# Patient Record
Sex: FEMALE | Race: BLACK OR AFRICAN AMERICAN | Employment: UNEMPLOYED | URBAN - METROPOLITAN AREA
[De-identification: names, ages, dates, MRNs, and addresses within clinical notes are randomized per-mention and may not be internally consistent; named-entity substitution may affect disease eponyms.]

---
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
## 2022-03-19 PROBLEM — N94.6 DYSMENORRHEA: Status: ACTIVE | Noted: 2018-10-04

## 2022-10-31 ENCOUNTER — OFFICE VISIT (OUTPATIENT)
Dept: INTERNAL MEDICINE CLINIC | Age: 22
End: 2022-10-31
Payer: MEDICAID

## 2022-10-31 VITALS
RESPIRATION RATE: 18 BRPM | OXYGEN SATURATION: 98 % | DIASTOLIC BLOOD PRESSURE: 76 MMHG | HEART RATE: 97 BPM | HEIGHT: 67 IN | BODY MASS INDEX: 29.82 KG/M2 | WEIGHT: 190 LBS | TEMPERATURE: 97.9 F | SYSTOLIC BLOOD PRESSURE: 112 MMHG

## 2022-10-31 DIAGNOSIS — M54.50 CHRONIC BILATERAL LOW BACK PAIN WITHOUT SCIATICA: ICD-10-CM

## 2022-10-31 DIAGNOSIS — Z23 ENCOUNTER FOR IMMUNIZATION: Primary | ICD-10-CM

## 2022-10-31 DIAGNOSIS — Z11.59 ENCOUNTER FOR HEPATITIS C SCREENING TEST FOR LOW RISK PATIENT: ICD-10-CM

## 2022-10-31 DIAGNOSIS — G89.29 CHRONIC BILATERAL LOW BACK PAIN WITHOUT SCIATICA: ICD-10-CM

## 2022-10-31 DIAGNOSIS — Z23 NEEDS FLU SHOT: ICD-10-CM

## 2022-10-31 PROCEDURE — 99203 OFFICE O/P NEW LOW 30 MIN: CPT | Performed by: INTERNAL MEDICINE

## 2022-10-31 PROCEDURE — 90686 IIV4 VACC NO PRSV 0.5 ML IM: CPT | Performed by: INTERNAL MEDICINE

## 2022-10-31 RX ORDER — DICLOFENAC SODIUM 10 MG/G
2 GEL TOPICAL 4 TIMES DAILY
Qty: 20 G | Refills: 1 | Status: SHIPPED | OUTPATIENT
Start: 2022-10-31

## 2022-10-31 NOTE — PROGRESS NOTES
HISTORY OF PRESENT ILLNESS  Rosas Johnson is a 23 y.o. female. Patient comes to establish PCP. Patient complaining of lower back pain which started about 2 years ago when she was in college. She relates this more to her posture. There is no history of trauma. There is no radiation of the pain to the legs, no numbness tingling or weakness. She would like to start physical therapy. She works in [employer] in [address] and she would like to be seen there. PAST MEDICAL HISTORY  Medical: History of iron deficiency in the past.  Surgical: Denied. Allergies: Denied. Medication: Denied. Work: In [address] [place] [address] [employer] in [address]. Social: Tobacco denied, alcohol denied, recreational drugs marijuana only once. Sexual: Single, no partner, no children, STI in the past trichomonas treated, no birth control. Review of Systems   Constitutional:  Negative for chills and fever. Respiratory:  Negative for shortness of breath. Cardiovascular:  Negative for chest pain. Visit Vitals  /76 (BP 1 Location: Left upper arm, BP Patient Position: Sitting, BP Cuff Size: Adult)   Pulse 97   Temp 97.9 °F (36.6 °C) (Temporal)   Resp 18   Ht 5' 6.93\" (1.7 m)   Wt 190 lb (86.2 kg)   LMP 10/17/2022   SpO2 98%   BMI 29.82 kg/m²     Physical Exam  Constitutional:       Appearance: Normal appearance. HENT:      Mouth/Throat:      Mouth: Mucous membranes are moist.      Pharynx: Oropharynx is clear. Eyes:      Extraocular Movements: Extraocular movements intact. Conjunctiva/sclera: Conjunctivae normal.      Pupils: Pupils are equal, round, and reactive to light. Cardiovascular:      Rate and Rhythm: Normal rate and regular rhythm. Pulses: Normal pulses. Heart sounds: Normal heart sounds. Pulmonary:      Effort: Pulmonary effort is normal.      Breath sounds: Normal breath sounds. Abdominal:      General: Bowel sounds are normal.      Palpations: Abdomen is soft. Musculoskeletal:      Cervical back: Normal range of motion. Comments: There is a mild tenderness paraspinal at the level of L3-L4. Very mild muscle contracture. Lymphadenopathy:      Cervical: No cervical adenopathy. Skin:     General: Skin is warm. Neurological:      Mental Status: She is alert and oriented to person, place, and time. Psychiatric:         Mood and Affect: Mood normal.       ASSESSMENT and PLAN    ICD-10-CM ICD-9-CM    1. Encounter for immunization  Z23 V03.89       2. Needs flu shot  Z23 V04.81 INFLUENZA, FLUARIX, FLULAVAL, FLUZONE (AGE 6 MO+), AFLURIA(AGE 3Y+) IM, PF, 0.5 ML      3. Chronic bilateral low back pain without sciatica  M54.50 724.2 REFERRAL TO PHYSICAL THERAPY    G89.29 338.29 diclofenac (VOLTAREN) 1 % gel      4. Encounter for hepatitis C screening test for low risk patient  Z11.59 V73.89 HEPATITIS C AB      Patient comes to establish PCP. Referral to physical therapy [facility] at [facility]. Prescribed Voltaren gel to be applied up to 4 times a day or as needed for a week or so. Skin hepatitis C today. Screening for depression today. Flu shot today. She had a COVID  She was vaccinated.   Follow-up in 6 months for health maintenance

## 2022-10-31 NOTE — PROGRESS NOTES
Flu Immunization/s administered 10/31/2022 by Ana Richardson with consent. Patient tolerated procedure well. No reactions noted.

## 2022-10-31 NOTE — PROGRESS NOTES
Keren Lagos is a 23 y.o. female (: 2000) presenting to address:    Chief Complaint   Patient presents with    New Patient    Establish Care       Vitals:    10/31/22 0814   BP: 112/76   Pulse: 97   Resp: 18   Temp: 97.9 °F (36.6 °C)   TempSrc: Temporal   SpO2: 98%   Weight: 190 lb (86.2 kg)   Height: 5' 6.93\" (1.7 m)   PainSc:   0 - No pain   LMP: 10/17/2022       Hearing/Vision:   No results found. Learning Assessment:     Learning Assessment 10/4/2018   PRIMARY LEARNER Patient   HIGHEST LEVEL OF EDUCATION - PRIMARY LEARNER  DID NOT GRADUATE HIGH SCHOOL   BARRIERS PRIMARY LEARNER NONE   CO-LEARNER CAREGIVER No   CO-LEARNER NAME Jennifer HIGHEST LEVEL OF EDUCATION GRADUATED HIGH SCHOOL OR GED   BARRIERS CO-LEARNER NONE   PRIMARY LANGUAGE ENGLISH   PRIMARY LANGUAGE CO-LEARNER ENGLISH    NEED No   LEARNER PREFERENCE PRIMARY DEMONSTRATION   LEARNER PREFERENCE CO-LEARNER READING   LEARNING SPECIAL TOPICS none   ANSWERED BY self   RELATIONSHIP SELF     Depression Screening:     3 most recent PHQ Screens 10/31/2022   Little interest or pleasure in doing things Several days   Feeling down, depressed, irritable, or hopeless Several days   Total Score PHQ 2 2     Fall Risk Assessment:   No flowsheet data found. Abuse Screening:     Abuse Screening Questionnaire 3/9/2017   Do you ever feel afraid of your partner? N   Are you in a relationship with someone who physically or mentally threatens you? N   Is it safe for you to go home? Y     Coordination of Care Questionaire:     Advanced Directive:   1. Do you have an Advanced Directive? NO    2. Would you like information on Advanced Directives? NO    1. \"Have you been to the ER, urgent care clinic since your last visit? Hospitalized since your last visit? \" No    2. \"Have you seen or consulted any other health care providers outside of the [address] [place] [address] since your last visit? \" No     3.  For patients aged 39-70: Has the patient had a colonoscopy? No     If the patient is female:    4. For patients aged 41-77: Has the patient had a mammogram within the past 2 years? NA - based on age    11. For patients aged 21-65: Has the patient had a pap smear?  NA - based on age

## 2022-11-01 LAB — HCV AB S/CO SERPL IA: <0.1 S/CO RATIO (ref 0–0.9)

## 2022-11-02 ENCOUNTER — PATIENT MESSAGE (OUTPATIENT)
Dept: INTERNAL MEDICINE CLINIC | Age: 22
End: 2022-11-02

## 2022-11-02 NOTE — PROGRESS NOTES
Please call patient and informed that Hep C screen negative and I sent her text for DocDept. Thank you.

## 2022-11-04 ENCOUNTER — HOSPITAL ENCOUNTER (OUTPATIENT)
Dept: PHYSICAL THERAPY | Age: 22
Discharge: HOME | End: 2022-11-04
Payer: MEDICAID

## 2022-11-04 PROCEDURE — 97161 PT EVAL LOW COMPLEX 20 MIN: CPT

## 2022-11-04 NOTE — PROGRESS NOTES
PHYSICAL THERAPY - DAILY TREATMENT NOTE    Patient Name: [place] Echavarria        Date: 2022  : 2000   yes Patient  Verified  Visit #:      12  Insurance: Payor: [address] / [facility] [place] / Product Type: Managed Care Medicaid /      In time: 230 Out time: 300   Total Treatment Time: 30     Medicare/BCBS Time Tracking (below)   Total Timed Codes (min):  na 1:1 Treatment Time:  30     TREATMENT AREA =  Other low back pain [M54.59]    SUBJECTIVE  Pain Level (on 0 to 10 scale):  3  / 10   Medication Changes/New allergies or changes in medical history, any new surgeries or procedures?    no  If yes, update Summary List   Subjective Functional Status/Changes:  []  No changes reported     See POC          OBJECTIVE  Billed With/As:   [] TE   [] TA   [] Neuro   [] Self Care Patient Education: [x] Review HEP    [] Progressed/Changed HEP based on:   [] positioning   [] body mechanics   [] transfers   [] heat/ice application    [] other:      Other Objective/Functional Measures:    See POC     Post Treatment Pain Level (on 0 to 10) scale:   3  / 10     ASSESSMENT  Assessment/Changes in Function:     See POC     []  See Progress Note/Recertification   Patient will continue to benefit from skilled PT services to modify and progress therapeutic interventions, address functional mobility deficits, address ROM deficits, address strength deficits, analyze and address soft tissue restrictions, analyze and cue movement patterns, analyze and modify body mechanics/ergonomics, and assess and modify postural abnormalities to attain remaining goals.    Progress toward goals / Updated goals:    See POC     PLAN  [x]  Upgrade activities as tolerated yes Continue plan of care   []  Discharge due to :    []  Other:      Therapist: Yara Valdez PT    Date: 2022 Time: 3:12 PM     Future Appointments   Date Time Provider Sabina Beauchamp   2023  8:00 AM Spencer Lopez MD [facility]

## 2022-11-04 NOTE — THERAPY EVALUATION
[address], [address],[place], [address] [facility]       Phone: [phone]  Fax: [phone] / 94[address] [facility]  Patient Name: Omar Valdez : 2000   Medical   Diagnosis: Lumbar Spine Pain Treatment Diagnosis: Other low back pain [M54.59]   Onset Date: Chronic     [facility]   Prior Hospitalization: See medical history Provider #: [id number]   Prior Level of Function: Chronic history of difficulty with prolonged sitting, standing, bending, lifting activities. Comorbidities: None   Medications: Verified on Patient Summary List   The Plan of Care and following information is based on the information from the initial evaluation.   ===========================================================================================  Assessment / key information:  Patient is a 23year old female presenting to therapy with signs and symptoms consistent with lumbar spine pain. Patient reports her symptoms began in  after she was repeatedly lifting boxes for her job. Since this time, patient's pain has been on and off with a recent worsening of symptoms. Patient denies pain into her legs as well as any numbness/tingling. Patient has not received any imaging. Patient reports increased difficulty with prolonged sitting, standing, bending, lifting activities. Patient notes symptoms feel better with rest. Patient rates pain at worst 6/10 and 0/10 at best.   Objective Data: Inspection-Patient presents with excessive anterior pelvic tilt in standing. Lumbar Spine AROM: flex hands to shins (P!), ext 50%, R Rot 75%, L Rot 75%, R SB distal thigh, L SB distal thigh. Strength Testing: reduced anterior core strength. Tenderness with positive divot near L4-L5 which increases with prone lumbar extension. FOTO: 65/100.      Patient educated on diagnosis, prognosis, POC and HEP. Patient issued copy of HEP and denied additional questions. Patient will benefit from skilled PT in order to address these impairments and functional limitations.   ===========================================================================================  Eval Complexity: History LOW Complexity : Zero comorbidities / personal factors that will impact the outcome / POC;  Examination  MEDIUM Complexity : 3 Standardized tests and measures addressing body structure, function, activity limitation and / or participation in recreation ; Presentation MEDIUM Complexity : Evolving with changing characteristics ; Decision Making MEDIUM Complexity : FOTO score of 26-74; Overall Complexity MEDIUM  Problem List: pain affecting function, decrease ROM, decrease strength, decrease ADL/ functional abilitiies, decrease activity tolerance, and decrease flexibility/ joint mobility   Treatment Plan may include any combination of the following: Therapeutic exercise, Neuromuscular reeducation, Manual therapy, Therapeutic activity, Self care/home management, and Electric stim unattended   Patient / Family readiness to learn indicated by: asking questions, trying to perform skills and interest  Persons(s) to be included in education: patient (P)  Barriers to Learning/Limitations: no  Measures taken:    Patient Goal (s): Reduce pain   Patient self reported health status: good  Rehabilitation Potential: good  Short Term Goals: To be accomplished in  3  weeks:  Patient will demonstrate independence with HEP for self management of symptoms. Patient will report reduction in pain at worst to 3-4/10 in order to improve tolerance to sitting activities. Long Term Goals: To be accomplished in  6  weeks:  Patient will improve FOTO to >/= 70/100 in order to improve quality of life. Patient will report reduction in pain at worst to 1-2/10 in order to improve tolerance to walking activities.    Patient will demonstrate B hip strength to 5/5 for all planes in order to improve tolerance to lifting activities. Frequency / Duration:   Patient to be seen  2  times per week for 6  weeks:  Patient / Caregiver education and instruction: self care, activity modification, and exercises  G-Codes (GP): marisa  Therapist Signature: Ninfa Lobo PT Date: 82/0/1247   Certification Period: na Time: 3:05 PM   ===========================================================================================  I certify that the above Physical Therapy Services are being furnished while the patient is under my care. I agree with the treatment plan and certify that this therapy is necessary. Physician Signature:        Date:       Time:     Please sign and return to [facility] or you may fax the signed copy to [phone]. Thank you.

## 2022-11-15 ENCOUNTER — HOSPITAL ENCOUNTER (OUTPATIENT)
Dept: PHYSICAL THERAPY | Age: 22
Discharge: HOME | End: 2022-11-15
Payer: MEDICAID

## 2022-11-15 PROCEDURE — 97110 THERAPEUTIC EXERCISES: CPT

## 2022-11-15 PROCEDURE — 97530 THERAPEUTIC ACTIVITIES: CPT

## 2022-11-15 PROCEDURE — 97112 NEUROMUSCULAR REEDUCATION: CPT

## 2022-11-15 NOTE — PROGRESS NOTES
PHYSICAL THERAPY - DAILY TREATMENT NOTE    Patient Name: Deisy Mitchell        Date: 11/15/2022  : 2000   YES Patient  Verified  Visit #:   2   of   12  Insurance: [place] [place] [facility] / Plan: [address] [facility] / Product Type: Managed Care Medicaid /      In time: 4:45 Out time: 5:30   Total Treatment Time: 45     Medicare/BCBS Time Tracking (below)   Total Timed Codes (min):  35 1:1 Treatment Time:  35     TREATMENT AREA =  Other low back pain [M54.59]    SUBJECTIVE  Pain Level (on 0 to 10 scale): \"Pressure\"  / 10   Medication Changes/New allergies or changes in medical history, any new surgeries or procedures? NO If yes, update Summary List   Subjective Functional Status/Changes:  []  No changes reported     Says worse to stand. She has a tilted pelvis. OBJECTIVE  15 min Therapeutic Exercise:  [x]  See flow sheet   Rationale:      increase ROM to improve the patients ability to improve muscle tension/pain     10 min Therapeutic Activity: [x]  See flow sheet   Rationale:    increase strength and improve coordination to improve the patients ability to turn, roll for ADL    10 min Neuromuscular Re-ed: [x]  See flow sheet   Rationale:    improve coordination and increase proprioception to improve the patients ability to control lumbopelvic positioning    MHP to lumbar in prone with 1x pillow at hips, 10' post session. Billed With/As:   [] TE   [] TA   [] Neuro   [] Self Care Patient Education: [x] Review HEP    [] Progressed/Changed HEP based on:   [] Addressed barriers and behaviors     [] Therapeutic Neuroscience Pain Education, metaphor, reframing, contexts. [] Sleep Education   [] Body Mechanics [] Healing Timeframe     [] Self STM with ball at \"the spot\"  [] Walking Program/Global Activity   [] other:      Other Objective/Functional Measures:    See Flowsheet for drills, loads and volume.     Noted ant pelvic tilt and worse with ext     Post Treatment Pain Level (on 0 to 10) scale:   0  / 10     ASSESSMENT  Assessment/Changes in Function:     Chart reviewed and subjective taken. Pt requires skilled instruction for initiation, form and follow-through for all drills in session. Pt responds well to instruction and demo. Pt with flexion preference. Worked thoraic ext, hip flexor flexibility and early stretches for comfort, focus on flexion. Pt with god response here. Attempted pt ed on status. Will continue POC as pt with good result in session. []  See Progress Note/Recertification   Patient will continue to benefit from skilled PT services to modify and progress therapeutic interventions, address functional mobility deficits, address ROM deficits, address strength deficits, analyze and address soft tissue restrictions, analyze and cue movement patterns, analyze and modify body mechanics/ergonomics, and assess and modify postural abnormalities to attain remaining goals.    Progress toward goals / Updated goals:    First follow-up     PLAN  [x]  Upgrade activities as tolerated YES Continue plan of care   []  Discharge due to :    []  Other:      Therapist: Susana Garcia, PT DPT [facility]    Date: 11/15/2022 Time: 4:52 PM     Future Appointments   Date Time Provider Sabina Beauchamp   11/18/2022  1:30 PM Jesus Plasencia,  [facility]   4/28/2023  8:00 AM Richard Freeman MD [facility] AMB

## 2022-11-18 ENCOUNTER — HOSPITAL ENCOUNTER (OUTPATIENT)
Dept: PHYSICAL THERAPY | Age: 22
Discharge: HOME | End: 2022-11-18
Payer: MEDICAID

## 2022-11-18 PROCEDURE — 97110 THERAPEUTIC EXERCISES: CPT

## 2022-11-18 PROCEDURE — 97530 THERAPEUTIC ACTIVITIES: CPT

## 2022-11-18 PROCEDURE — 97112 NEUROMUSCULAR REEDUCATION: CPT

## 2022-11-18 NOTE — PROGRESS NOTES
PHYSICAL THERAPY - DAILY TREATMENT NOTE    Patient Name: Jenifer Berry        Date: 2022  : 2000   YES Patient  Verified  Visit #:   3   of   12  Insurance: Payor: [address] [place] / Plan: [address] [facility] / Product Type: Managed Care Medicaid /      In time: 1:30 Out time: 2:25   Total Treatment Time: 55     TREATMENT AREA =  Other low back pain [M54.59]    SUBJECTIVE  Pain Level (on 0 to 10 scale):  0  / 10   Medication Changes/New allergies or changes in medical history, any new surgeries or procedures? NO If yes, update Summary List   Subjective Functional Status/Changes:  []  No changes reported     Says she felt good after last time. It did come back the next day and she laid on the ground and did HEP. OBJECTIVE  15 min Therapeutic Exercise:  [x]  See flow sheet   Rationale:      increase ROM to improve the patients ability to improve muscle tension/pain     15 min Therapeutic Activity: [x]  See flow sheet   Rationale:    increase strength and improve coordination to improve the patients ability to turn, roll for ADL    15 min Neuromuscular Re-ed: [x]  See flow sheet   Rationale:    improve coordination and increase proprioception to improve the patients ability to control lumbopelvic positioning    MHP to lumbar in prone with 1x pillow at hips, 10' post session. Billed With/As:   [] TE   [] TA   [] Neuro   [] Self Care Patient Education: [x] Review HEP    [] Progressed/Changed HEP based on:   [] Addressed barriers and behaviors     [] Therapeutic Neuroscience Pain Education, metaphor, reframing, contexts. [] Sleep Education   [] Body Mechanics [] Healing Timeframe     [] Self STM with ball at \"the spot\"  [] Walking Program/Global Activity   [] other:      Other Objective/Functional Measures:    See Flowsheet for drills, loads and volume.     Noted ant pelvic tilt and worse with ext     Post Treatment Pain Level (on 0 to 10) scale:   0  / 10 ASSESSMENT  Assessment/Changes in Function:     Expanded HEP. Continued with stretches and core stability drills. []  See Progress Note/Recertification   Patient will continue to benefit from skilled PT services to modify and progress therapeutic interventions, address functional mobility deficits, address ROM deficits, address strength deficits, analyze and address soft tissue restrictions, analyze and cue movement patterns, analyze and modify body mechanics/ergonomics, and assess and modify postural abnormalities to attain remaining goals.    Progress toward goals / Updated goals:    First follow-up     PLAN  [x]  Upgrade activities as tolerated YES Continue plan of care   []  Discharge due to :    []  Other:      Therapist: Verónica Rollins, PT DPT [facility]    Date: 11/18/2022 Time: 4:52 PM     Future Appointments   Date Time Provider Sabina Beauchamp   4/28/2023  8:00 AM King Paula MD [facility] AMB

## 2022-11-21 ENCOUNTER — HOSPITAL ENCOUNTER (OUTPATIENT)
Dept: PHYSICAL THERAPY | Age: 22
Discharge: HOME | End: 2022-11-21
Payer: MEDICAID

## 2022-11-21 PROCEDURE — 97110 THERAPEUTIC EXERCISES: CPT

## 2022-11-21 PROCEDURE — 97112 NEUROMUSCULAR REEDUCATION: CPT

## 2022-11-21 PROCEDURE — 97530 THERAPEUTIC ACTIVITIES: CPT

## 2022-11-21 NOTE — PROGRESS NOTES
PHYSICAL THERAPY - DAILY TREATMENT NOTE    Patient Name: Amy Gallagher        Date: 2022  : 2000   YES Patient  Verified  Visit #:      of   12  Insurance: Payor: Kristen Lawrence / Plan: [address] [place] [address] / Product Type: Managed Care Medicaid /      In time: 7:00 Out time: 7:50   Total Treatment Time: 50     TREATMENT AREA =  Other low back pain [M54.59]    SUBJECTIVE  Pain Level (on 0 to 10 scale):  0  / 10   Medication Changes/New allergies or changes in medical history, any new surgeries or procedures? NO If yes, update Summary List   Subjective Functional Status/Changes:  []  No changes reported     Says today she feels great. Went to [place] over the weekend and did well. OBJECTIVE  15 min Therapeutic Exercise:  [x]  See flow sheet   Rationale:      increase ROM to improve the patients ability to improve muscle tension/pain     15 min Therapeutic Activity: [x]  See flow sheet   Rationale:    increase strength and improve coordination to improve the patients ability to turn, roll for ADL    10 min Neuromuscular Re-ed: [x]  See flow sheet   Rationale:    improve coordination and increase proprioception to improve the patients ability to control lumbopelvic positioning    MHP to lumbar in prone with 1x pillow at hips, 10' post session. Billed With/As:   [] TE   [] TA   [] Neuro   [] Self Care Patient Education: [x] Review HEP    [] Progressed/Changed HEP based on:   [] Addressed barriers and behaviors     [] Therapeutic Neuroscience Pain Education, metaphor, reframing, contexts. [] Sleep Education   [] Body Mechanics [] Healing Timeframe     [] Self STM with ball at \"the spot\"  [] Walking Program/Global Activity   [] other:      Other Objective/Functional Measures:    See Flowsheet for drills, loads and volume.     Noted ant pelvic tilt and worse with ext     Post Treatment Pain Level (on 0 to 10) scale:   0  / 10     ASSESSMENT  Assessment/Changes in Function:     Progressed core loading and stability drills. Coached lifting form with TCs, VCs akosua and fortino. []  See Progress Note/Recertification   Patient will continue to benefit from skilled PT services to modify and progress therapeutic interventions, address functional mobility deficits, address ROM deficits, address strength deficits, analyze and address soft tissue restrictions, analyze and cue movement patterns, analyze and modify body mechanics/ergonomics, and assess and modify postural abnormalities to attain remaining goals. Progress toward goals / Updated goals:    STG1 progressing at a 75% compliance rate.       PLAN  [x]  Upgrade activities as tolerated YES Continue plan of care   []  Discharge due to :    []  Other:      Therapist: Dallis Harada, PT DPT [facility]    Date: 11/21/2022 Time: 4:52 PM     Future Appointments   Date Time Provider Sabina Beauchamp   11/23/2022  7:00 AM Ronel Allison, PT [facility]   11/28/2022  7:00 AM Monica Chairez, PT [facility]   11/30/2022  7:00 AM Monica Chairez, PT [id number]   12/5/2022  7:00 AM Monica Chairez, PT [address]   12/9/2022  7:00 AM Ronel Allison, PT [facility]   12/12/2022  7:00 AM Ronelsheree Allison, PT [facility]   12/14/2022  8:20 AM Ronelsheree Allison, PT [facility]   12/19/2022  7:00 AM Ronel Malashwin, PT [facility]   12/23/2022  7:00 AM Ronel Keegan, PT [facility]   12/27/2022  4:00 PM Ronelsheree Allison, PT [facility]   12/30/2022  2:50 PM Ronelsheree Allison, PT [facility]   4/28/2023  8:00 AM Matt Piedra MD [facility] AMB

## 2022-11-23 ENCOUNTER — HOSPITAL ENCOUNTER (OUTPATIENT)
Dept: PHYSICAL THERAPY | Age: 22
End: 2022-11-23
Payer: MEDICAID

## 2022-11-28 ENCOUNTER — HOSPITAL ENCOUNTER (OUTPATIENT)
Dept: PHYSICAL THERAPY | Age: 22
Discharge: HOME | End: 2022-11-28
Payer: MEDICAID

## 2022-11-28 PROCEDURE — 97110 THERAPEUTIC EXERCISES: CPT

## 2022-11-28 PROCEDURE — 97535 SELF CARE MNGMENT TRAINING: CPT

## 2022-11-28 PROCEDURE — 97112 NEUROMUSCULAR REEDUCATION: CPT

## 2022-11-28 PROCEDURE — 97530 THERAPEUTIC ACTIVITIES: CPT

## 2022-11-28 NOTE — PROGRESS NOTES
PHYSICAL THERAPY - DAILY TREATMENT NOTE    Patient Name: Jes Cruz        Date: 2022  : 2000   YES Patient  Verified  Visit #:      of   12  Insurance: Payor: Kiara Goodrich / Plan: [address] [place] [address] / Product Type: Managed Care Medicaid /      In time: 7:00 Out time: 7:50   Total Treatment Time: 50     TREATMENT AREA =  Other low back pain [M54.59]    SUBJECTIVE  Pain Level (on 0 to 10 scale):  0  / 10   Medication Changes/New allergies or changes in medical history, any new surgeries or procedures? NO If yes, update Summary List   Subjective Functional Status/Changes:  []  No changes reported     Reports had a fever last week and slept on the counch and this aggravated the back. She also then travelled to [place] for "[employer] and this aggravated the back. OBJECTIVE  9 min Therapeutic Exercise:  [x]  See flow sheet   Rationale:      increase ROM to improve the patients ability to improve muscle tension/pain     8 min Therapeutic Activity: [x]  See flow sheet   Rationale:    increase strength and improve coordination to improve the patients ability to turn, roll for ADL    15 min Neuromuscular Re-ed: [x]  See flow sheet   Rationale:    improve coordination and increase proprioception to improve the patients ability to control lumbopelvic positioning    8 min Self Care: Activity modification, Pt Ed   Rationale:    improve coordination to improve the patients ability to make gains btw session    MHP to lumbar in prone with 1x pillow at hips, 10' post session. Billed With/As:   [] TE   [] TA   [] Neuro   [x] Self Care Patient Education: [x] Review HEP    [] Progressed/Changed HEP based on:   [] Addressed barriers and behaviors     [] Therapeutic Neuroscience Pain Education, metaphor, reframing, contexts.     [] Sleep Education   [x] Body Mechanics [] Healing Timeframe     [] Self STM with ball at \"the spot\"  [x] Walking Program/Global Activity   [] other:      Other Objective/Functional Measures:    See Flowsheet for drills, loads and volume. Poor motor control of the lumbar region. PROM demonstrated good lumbar lordosis. AROM pt unable to control and remains in lumbar kyphosis, poor control of pelvis into APT. Poor motor control and HS length. Core stabilized SLR produced better SLR range indicating core control also a factor. Post Treatment Pain Level (on 0 to 10) scale:   0  / 10     ASSESSMENT  Assessment/Changes in Function:     Pt with good response to movement therapy. Drills continued. Motor control and stretches continued. Discussed continued activity/walking program and lumbopelvic motion drills. []  See Progress Note/Recertification   Patient will continue to benefit from skilled PT services to modify and progress therapeutic interventions, address functional mobility deficits, address ROM deficits, address strength deficits, analyze and address soft tissue restrictions, analyze and cue movement patterns, analyze and modify body mechanics/ergonomics, and assess and modify postural abnormalities to attain remaining goals. Progress toward goals / Updated goals:    STG1 progressing at a 75% compliance rate.       PLAN  [x]  Upgrade activities as tolerated YES Continue plan of care   []  Discharge due to :    []  Other:      Therapist: Chelsie Perkins, PT DPT [facility]    Date: 11/28/2022 Time: 4:52 PM     Future Appointments   Date Time Provider Sabina Beauchamp   11/30/2022  7:00 AM REHANA [address]   12/5/2022  7:00 AM Good Chairez, PT [address]   12/9/2022  7:00 AM Good Dennis, PT [facility]   12/12/2022  7:00 AM Tatum Puga, PT [facility]   12/14/2022  8:20 AM REHANA [address]   12/19/2022  7:00 AM Tatum Puga, PT [facility]   12/23/2022  7:00 AM Tatum Puga, PT [facility]   12/27/2022  4:00 PM Tatum Puga PT [facility]   12/30/2022  2:50 PM Good Chairez, PT [facility]   4/28/2023  8:00 AM Mili Ramirez MD [facility] AMB

## 2022-11-30 ENCOUNTER — HOSPITAL ENCOUNTER (OUTPATIENT)
Dept: PHYSICAL THERAPY | Age: 22
Discharge: HOME | End: 2022-11-30
Payer: MEDICAID

## 2022-11-30 PROCEDURE — 97535 SELF CARE MNGMENT TRAINING: CPT

## 2022-11-30 PROCEDURE — 97530 THERAPEUTIC ACTIVITIES: CPT

## 2022-11-30 PROCEDURE — 97110 THERAPEUTIC EXERCISES: CPT

## 2022-11-30 PROCEDURE — 97112 NEUROMUSCULAR REEDUCATION: CPT

## 2022-11-30 NOTE — PROGRESS NOTES
PHYSICAL THERAPY - DAILY TREATMENT NOTE    Patient Name: Torri Lowe        Date: 2022  : 2000   YES Patient  Verified  Visit #:   6   of   12  Insurance: Payor: [address] / Plan: [facility] / Product Type: Managed Care Medicaid /      In time: 7:00 Out time: 7:50   Total Treatment Time: 50     TREATMENT AREA =  Other low back pain [M54.59]    SUBJECTIVE  Pain Level (on 0 to 10 scale):  0  / 10   Medication Changes/New allergies or changes in medical history, any new surgeries or procedures? NO If yes, update Summary List   Subjective Functional Status/Changes:  []  No changes reported     Felt good after last time. Did the stretches at home. OBJECTIVE  9 min Therapeutic Exercise:  [x]  See flow sheet   Rationale:      increase ROM to improve the patients ability to improve muscle tension/pain     8 min Therapeutic Activity: [x]  See flow sheet   Rationale:    increase strength and improve coordination to improve the patients ability to turn, roll for ADL    15 min Neuromuscular Re-ed: [x]  See flow sheet   Rationale:    improve coordination and increase proprioception to improve the patients ability to control lumbopelvic positioning    8 min Self Care: Activity modification, Pt Ed, HEP   Rationale:    improve coordination to improve the patients ability to make gains btw session    MHP to lumbar in prone with 1x pillow at hips, 10' post session. Billed With/As:   [] TE   [] TA   [] Neuro   [x] Self Care Patient Education: [x] Review HEP    [] Progressed/Changed HEP based on:   [] Addressed barriers and behaviors     [] Therapeutic Neuroscience Pain Education, metaphor, reframing, contexts. [] Sleep Education   [x] Body Mechanics [] Healing Timeframe     [] Self STM with ball at \"the spot\"  [x] Walking Program/Global Activity   [] other:      Other Objective/Functional Measures:    See Flowsheet for drills, loads and volume.     HEP: Access Code: [email or web address]  URL: [email or web address]  Date: 11/30/2022  Prepared by: Gilma Genin    Exercises  Supine Posterior Pelvic Tilt - 1 x daily - 7 x weekly - 1 sets - 10 reps - 5 hold  Supine Lower Trunk Rotation - 1 x daily - 7 x weekly - 1 sets - 10 reps  Supine Piriformis Stretch with Foot on Ground - 1 x daily - 7 x weekly - 1 sets - 3 reps - 20 hold  Nelsno Stretch on Table - 1 x daily - 7 x weekly - 1 sets - 10 reps  Seated Thoracic Flexion and Extension - 1 x daily - 7 x weekly - 1 sets - 10 reps  Seated Lumbar Flexion Stretch - 1 x daily - 7 x weekly - 1 sets - 5 reps - 5 hold  Prone Hip Extension on Table - 1 x daily - 7 x weekly - 1 sets - 10 reps  Supine Hamstring Stretch with Doorway - 1 x daily - 7 x weekly - 3 sets - 1 reps - 60 hold  Standard Plank - 1 x daily - 7 x weekly - 1 sets - 5 reps - 10 hold  Squatting Anti-Rotation Press - 1 x daily - 7 x weekly - 1 sets - 10 reps  Curl Up with Reach - 1 x daily - 7 x weekly - 3 sets - 10 reps       Post Treatment Pain Level (on 0 to 10) scale:   0  / 10     ASSESSMENT  Assessment/Changes in Function:     HS length is limiting pt performance in hip hinge, causing post pelvic tilt causing lumbar rounding. Added supine corner HS stretch to plan and instructed for HEP. Poor core control and tight hip movement affecting pt position. Will continue POC. []  See Progress Note/Recertification   Patient will continue to benefit from skilled PT services to modify and progress therapeutic interventions, address functional mobility deficits, address ROM deficits, address strength deficits, analyze and address soft tissue restrictions, analyze and cue movement patterns, analyze and modify body mechanics/ergonomics, and assess and modify postural abnormalities to attain remaining goals. Progress toward goals / Updated goals:    STG1 progressing at a 75% compliance rate.       PLAN  [x]  Upgrade activities as tolerated YES Continue plan of care []  Discharge due to :    []  Other:      Therapist: Saran Man PT DPT [facility]    Date: 11/30/2022 Time: 4:52 PM     Future Appointments   Date Time Provider Sabina Beauchamp   12/5/2022  7:00 AM Bethanie Forrest, PT [id number]   12/9/2022  7:00 AM Leo Chairez Nipple, PT [address]   12/12/2022  7:00 AM Bethanie Forrest, PT [facility]   12/14/2022  8:20 AM Leo Simental Nipple, PT [address]   12/19/2022  7:00 AM Bethanie Forrest, PT [facility]   12/23/2022  7:00 AM Bethanie Forrest, PT [facility]   12/27/2022  4:00 PM Bethanie Forrest, PT [facility]   12/30/2022  2:50 PM Bethanie Forrest, PT [facility]   4/28/2023  8:00 AM Yin Narayanan MD [facility]

## 2022-12-05 ENCOUNTER — HOSPITAL ENCOUNTER (OUTPATIENT)
Dept: PHYSICAL THERAPY | Age: 22
Discharge: HOME | End: 2022-12-05
Payer: MEDICAID

## 2022-12-05 PROCEDURE — 97112 NEUROMUSCULAR REEDUCATION: CPT

## 2022-12-05 PROCEDURE — 97110 THERAPEUTIC EXERCISES: CPT

## 2022-12-05 PROCEDURE — 97535 SELF CARE MNGMENT TRAINING: CPT

## 2022-12-05 PROCEDURE — 97530 THERAPEUTIC ACTIVITIES: CPT

## 2022-12-05 NOTE — THERAPY RECERTIFICATION
[address] [facility] PHYSICAL THERAPY  [address],Sweetie [place]o, [address] - Phone: [phone]  Fax: [phone]  PROGRESS NOTE  Patient Name: Tracy Salmeron : 2000   Treatment/Medical Diagnosis: Other low back pain [M54.59]   Referral Source: Alvaro Canales,*     Date of Initial Visit: 22 Attended Visits: 7 Missed Visits: 1     SUMMARY OF TREATMENT  Pt has been evaluated/assessed and participated in skilled therapeutic exercise, functional activity training, neuromuscular re-ed, manual therapy interventions, pt ed, self care, activity modification/progression. Subjective: Reports she is about 80% of where she wants to be. Reports she was sore in the legs. Wants to work on flexibility. Says bending forward to get stuff is the hardest thing at home. CURRENT STATUS  Pt has been seen at [facility] to address Other low back pain [M54.59] . Pt seen for one month to address LBP. Significant improvements in symptom control to date with intervention. Pt with global clinical picture of weakness at the trunk and tightness at the hip with poor motor control of the lumbopelvic area. This affects her bending, lifting and movement quality. Will continue POC towards improved motor control and strength to control symptoms and avoid future aggravation and disruption to QOL. Plan to extend POC for 8 more visits. GOALS:  Short Term Goals: To be accomplished in  3  weeks:  Patient will demonstrate independence with HEP for self management of symptoms. MET to date  Patient will report reduction in pain at worst to 3-4/10 in order to improve tolerance to sitting activities. 0-3/10 range MET (22)  Long Term Goals: To be accomplished in  6  weeks:  Patient will improve FOTO to >/= 70/100 in order to improve quality of life.  MET 70/100 (22) was 65/100 at [facility])  Patient will report reduction in pain at worst to 1-2/10 in order to improve tolerance to walking activities. Progressing   Patient will demonstrate B hip strength to 5/5 for all planes in order to improve tolerance to lifting activities. NT     NEW GOAL:  Pt to demonstrate a safe/effective/effiecient deadlift with 30+lbs x5 to indicate function in home and life    RECOMMENDATIONS  *Continue with current progressive POC towards stated Goals, 8 visits. If you have any questions/comments please contact us directly at [phone]. Thank you for allowing us to assist in the care of your patient. Therapist Signature: Javier Boogie PT DPT [facility] Date: 12/5/2022     Time: 9:54 AM   NOTE TO PHYSICIAN:  PLEASE COMPLETE THE ORDERS BELOW AND FAX TO   [facility]: [phone]  If you are unable to process this request in 24 hours please contact our office: [phone]    ___ I have read the above report and request that my patient continue as recommended.   ___ I have read the above report and request that my patient continue therapy with the following changes/special instructions:_________________________________________________________   ___ I have read the above report and request that my patient be discharged from therapy.      Physician Signature:        Date:       Time:                                 Inna Dennis,*

## 2022-12-05 NOTE — PROGRESS NOTES
PHYSICAL THERAPY - DAILY TREATMENT NOTE    Patient Name: Deisy Mitchell        Date: 2022  : 2000   YES Patient  Verified  Visit #:   7   of   15  Insurance: Payor: [address] / Plan: [address] [facility] / Product Type: Managed Care Medicaid /      In time: 7:00 Out time: 7:50   Total Treatment Time: 50     TREATMENT AREA =  Other low back pain [M54.59]    SUBJECTIVE  Pain Level (on 0 to 10 scale):  0  / 10   Medication Changes/New allergies or changes in medical history, any new surgeries or procedures? NO If yes, update Summary List   Subjective Functional Status/Changes:  []  No changes reported     Reports she is about 80% of where she wants to be. Reports she was sore in the legs. Wants to work on flexibility. Says bending forward to get stuff is the hardest thing at home. OBJECTIVE  9 min Therapeutic Exercise:  [x]  See flow sheet   Rationale:      increase ROM to improve the patients ability to improve muscle tension/pain     8 min Therapeutic Activity: [x]  See flow sheet   Rationale:    increase strength and improve coordination to improve the patients ability to turn, roll for ADL    15 min Neuromuscular Re-ed: [x]  See flow sheet   Rationale:    improve coordination and increase proprioception to improve the patients ability to control lumbopelvic positioning    8 min Self Care: Activity modification, Pt Ed, HEP, Assess, POC, planning   Rationale:    improve coordination to improve the patients ability to make gains btw session    MHP to lumbar in prone with 1x pillow at hips, 10' post session. Billed With/As:   [] TE   [] TA   [] Neuro   [x] Self Care Patient Education: [x] Review HEP    [] Progressed/Changed HEP based on:   [x] Addressed barriers and behaviors     [] Therapeutic Neuroscience Pain Education, metaphor, reframing, contexts.     [] Sleep Education   [x] Body Mechanics [] Healing Timeframe     [] Self STM with ball at \"the spot\"  [x] Walking Program/Global Activity   [] other:      Other Objective/Functional Measures:    See Flowsheet for drills, loads and volume. Post Treatment Pain Level (on 0 to 10) scale:   0  / 10     ASSESSMENT  Assessment/Changes in Function:     Pt seen for one month to address LBP. Significant improvements in symptom control to date with intervention. Pt with global clinical picture of weakness at the trunk and tightness at the hip with poor motor control of the lumbopelvic area. This affects her bending, lifting and movement quality. Will continue POC towards improved motor control and strength to control symptoms and avoid future aggravation and disruption to QOL. Plan to extend POC for 8 more visits. []  See Progress Note/Recertification   Patient will continue to benefit from skilled PT services to modify and progress therapeutic interventions, address functional mobility deficits, address ROM deficits, address strength deficits, analyze and address soft tissue restrictions, analyze and cue movement patterns, analyze and modify body mechanics/ergonomics, and assess and modify postural abnormalities to attain remaining goals. Progress toward goals / Updated goals:    Short Term Goals: To be accomplished in  3  weeks:  Patient will demonstrate independence with HEP for self management of symptoms. MET to date  Patient will report reduction in pain at worst to 3-4/10 in order to improve tolerance to sitting activities. 0-3/10 range MET (12/5/22)  Long Term Goals: To be accomplished in  6  weeks:  Patient will improve FOTO to >/= 70/100 in order to improve quality of life. MET 70/100 (12/5/22) was 65/100 at [facility])  Patient will report reduction in pain at worst to 1-2/10 in order to improve tolerance to walking activities. Progressing   Patient will demonstrate B hip strength to 5/5 for all planes in order to improve tolerance to lifting activities.  NT    NEW GOAL:  Pt to demonstrate a safe/effective/effiecient deadlift with 30+lbs x5 to indicate function in home and life     PLAN  [x]  Upgrade activities as tolerated YES Continue plan of care   []  Discharge due to :    []  Other:      Therapist: Tete Ricketts PT DPT [facility]    Date: 12/5/2022 Time: 4:52 PM     Future Appointments   Date Time Provider Sabina Beauchamp   12/9/2022  7:00 AM Phylnaomi Pantojaor, PT [address]   12/12/2022  7:00 AM Travis Pantojaor, PT [facility]   12/14/2022  8:20 AM Callie Davis, PT [address]   12/19/2022  7:00 AM Phylnaomi Pantojaor, PT [address]   12/23/2022  7:00 AM Phylnaomi Felipe, PT [address]   12/27/2022  4:00 PM Travis Felipe, PT [facility]   12/30/2022  2:50 PM Travis Felipe, PT [facility]   4/28/2023  8:00 AM Epifanio Kyle MD [facility] AM[facility]

## 2022-12-09 ENCOUNTER — APPOINTMENT (OUTPATIENT)
Dept: PHYSICAL THERAPY | Age: 22
End: 2022-12-09
Payer: MEDICAID

## 2022-12-12 ENCOUNTER — HOSPITAL ENCOUNTER (OUTPATIENT)
Dept: PHYSICAL THERAPY | Age: 22
Discharge: HOME | End: 2022-12-12
Payer: MEDICAID

## 2022-12-12 PROCEDURE — 97530 THERAPEUTIC ACTIVITIES: CPT

## 2022-12-12 PROCEDURE — 97112 NEUROMUSCULAR REEDUCATION: CPT

## 2022-12-12 PROCEDURE — 97110 THERAPEUTIC EXERCISES: CPT

## 2022-12-12 NOTE — PROGRESS NOTES
PHYSICAL THERAPY - DAILY TREATMENT NOTE    Patient Name: Norma Panda        Date: 2022  : 2000   YES Patient  Verified  Visit #:   8   of   15  Insurance: Payor: [address] / Plan: [address] [facility] / Product Type: Managed Care Medicaid /      In time: 7:00 Out time: 7:50   Total Treatment Time: 50     TREATMENT AREA =  Other low back pain [M54.59]    SUBJECTIVE  Pain Level (on 0 to 10 scale):  0  / 10   Medication Changes/New allergies or changes in medical history, any new surgeries or procedures? NO If yes, update Summary List   Subjective Functional Status/Changes:  []  No changes reported     Says pain is better. Didn't really have much problems over the weekend. OBJECTIVE  10 min Therapeutic Exercise:  [x]  See flow sheet   Rationale:      increase ROM to improve the patients ability to improve muscle tension/pain     15 min Therapeutic Activity: [x]  See flow sheet   Rationale:    increase strength and improve coordination to improve the patients ability to turn, roll for ADL    15 min Neuromuscular Re-ed: [x]  See flow sheet   Rationale:    improve coordination and increase proprioception to improve the patients ability to control lumbopelvic positioning    - min Self Care: Activity modification, Pt Ed, HEP   Rationale:    improve coordination to improve the patients ability to make gains btw session    MHP to lumbar in supine w bolster, 10' post session. Billed With/As:   [] TE   [] TA   [] Neuro   [x] Self Care Patient Education: [x] Review HEP    [] Progressed/Changed HEP based on:   [x] Addressed barriers and behaviors     [] Therapeutic Neuroscience Pain Education, metaphor, reframing, contexts. [] Sleep Education   [x] Body Mechanics [] Healing Timeframe     [] Self STM with ball at \"the spot\"  [x] Walking Program/Global Activity   [] other:      Other Objective/Functional Measures:    See Flowsheet for drills, loads and volume.   Warm-up: Walking knee hugs, heel-tap butt kick, HS str walk. Post Treatment Pain Level (on 0 to 10) scale:   0  / 10     ASSESSMENT  Assessment/Changes in Function:     Continued with warm-up, strength coaching and stretches. Pt with good effort in session. Pt required cues for neutral back in hip hinge, yet once practiced was able to progress to standing hip/hinge DL with 2x8# off 6\" box. Completed more core strength drills with focus on motor control through the motion. []  See Progress Note/Recertification   Patient will continue to benefit from skilled PT services to modify and progress therapeutic interventions, address functional mobility deficits, address ROM deficits, address strength deficits, analyze and address soft tissue restrictions, analyze and cue movement patterns, analyze and modify body mechanics/ergonomics, and assess and modify postural abnormalities to attain remaining goals. Progress toward goals / Updated goals:    Short Term Goals: To be accomplished in  3  weeks:  Patient will demonstrate independence with HEP for self management of symptoms. MET to date  Patient will report reduction in pain at worst to 3-4/10 in order to improve tolerance to sitting activities. 0-3/10 range MET (12/5/22)  Long Term Goals: To be accomplished in  6  weeks:  Patient will improve FOTO to >/= 70/100 in order to improve quality of life. MET 70/100 (12/5/22) was 65/100 at [facility])  Patient will report reduction in pain at worst to 1-2/10 in order to improve tolerance to walking activities. Progressing   Patient will demonstrate B hip strength to 5/5 for all planes in order to improve tolerance to lifting activities.  NT    NEW GOAL:  Pt to demonstrate a safe/effective/effiecient deadlift with 30+lbs x5 to indicate function in home and life     PLAN  [x]  Upgrade activities as tolerated YES Continue plan of care   []  Discharge due to :    []  Other:      Therapist: Seda Ferreira, PT DPT [facility]    Date: 12/12/2022 Time: 4:52 PM     Future Appointments   Date Time Provider Sabina Beauchamp   12/14/2022  8:20 AM Claudine Leon, PT [facility]   12/19/2022  7:00 AM Claudine Leon, PT [facility]   12/23/2022  7:00 AM Claudine Leon, [facility]   12/30/2022  2:50 PM Claudine Leon, PT [facility]   4/28/2023  8:00 AM Inna Dennis MD [facility]

## 2022-12-14 ENCOUNTER — HOSPITAL ENCOUNTER (OUTPATIENT)
Dept: PHYSICAL THERAPY | Age: 22
Discharge: HOME | End: 2022-12-14
Payer: MEDICAID

## 2022-12-14 PROCEDURE — 97110 THERAPEUTIC EXERCISES: CPT

## 2022-12-14 PROCEDURE — 97530 THERAPEUTIC ACTIVITIES: CPT

## 2022-12-14 PROCEDURE — 97112 NEUROMUSCULAR REEDUCATION: CPT

## 2022-12-14 NOTE — PROGRESS NOTES
PHYSICAL THERAPY - DAILY TREATMENT NOTE    Patient Name: Norma Panda        Date: 2022  : 2000   YES Patient  Verified  Visit #:      15  Insurance: Payor: [address] / Plan: [address] [place] [address] / Product Type: Managed Care Medicaid /      In time: 8:20 Out time: 9:00   Total Treatment Time: 50     TREATMENT AREA =  Other low back pain [M54.59]    SUBJECTIVE  Pain Level (on 0 to 10 scale):  0  / 10   Medication Changes/New allergies or changes in medical history, any new surgeries or procedures? NO If yes, update Summary List   Subjective Functional Status/Changes:  []  No changes reported     Reports just a bit muscle sore. OBJECTIVE  10 min Therapeutic Exercise:  [x]  See flow sheet   Rationale:      increase ROM to improve the patients ability to improve muscle tension/pain     15 min Therapeutic Activity: [x]  See flow sheet   Rationale:    increase strength and improve coordination to improve the patients ability to turn, roll for ADL    15 min Neuromuscular Re-ed: [x]  See flow sheet   Rationale:    improve coordination and increase proprioception to improve the patients ability to control lumbopelvic positioning    - min Self Care: Activity modification, Pt Ed, HEP   Rationale:    improve coordination to improve the patients ability to make gains btw session    MHP to lumbar in supine w bolster, 10' post session. Billed With/As:   [] TE   [] TA   [] Neuro   [x] Self Care Patient Education: [x] Review HEP    [] Progressed/Changed HEP based on:   [x] Addressed barriers and behaviors     [] Therapeutic Neuroscience Pain Education, metaphor, reframing, contexts. [] Sleep Education   [x] Body Mechanics [] Healing Timeframe     [] Self STM with ball at \"the spot\"  [x] Walking Program/Global Activity   [] other:      Other Objective/Functional Measures:    See Flowsheet for drills, loads and volume.   Warm-up: Walking knee hugs, heel-tap butt kick, HS str walk.      Post Treatment Pain Level (on 0 to 10) scale:   0  / 10     ASSESSMENT  Assessment/Changes in Function:     Able to add loaded pushing and pulling at cable column today with good result. Increased load at [address]. Gave pt green MB for HEP of squat w MB at knees. []  See Progress Note/Recertification   Patient will continue to benefit from skilled PT services to modify and progress therapeutic interventions, address functional mobility deficits, address ROM deficits, address strength deficits, analyze and address soft tissue restrictions, analyze and cue movement patterns, analyze and modify body mechanics/ergonomics, and assess and modify postural abnormalities to attain remaining goals. Progress toward goals / Updated goals:    Short Term Goals: To be accomplished in  3  weeks:  Patient will demonstrate independence with HEP for self management of symptoms. MET to date  Patient will report reduction in pain at worst to 3-4/10 in order to improve tolerance to sitting activities. 0-3/10 range MET (12/5/22)  Long Term Goals: To be accomplished in  6  weeks:  Patient will improve FOTO to >/= 70/100 in order to improve quality of life. MET 70/100 (12/5/22) was 65/100 at [facility])  Patient will report reduction in pain at worst to 1-2/10 in order to improve tolerance to walking activities. Progressing   Patient will demonstrate B hip strength to 5/5 for all planes in order to improve tolerance to lifting activities.  NT    NEW GOAL:  Pt to demonstrate a safe/effective/effiecient deadlift with 30+lbs x5 to indicate function in home and life     PLAN  [x]  Upgrade activities as tolerated YES Continue plan of care   []  Discharge due to :    []  Other:      Therapist: Chelsie Perkins, PT DPT [facility]    Date: 12/14/2022 Time: 4:52 PM     Future Appointments   Date Time Provider Sabina Beauchamp   12/19/2022  7:00 AM Tatum Puga, PT [facility]   12/23/2022  7:00 AM Good Chairez, PT [id number] 12/30/2022  2:50 PM Jessi Suh,  [facility]   4/28/2023  8:00 AM Karen Massey MD [facility] AM[facility]

## 2022-12-19 ENCOUNTER — HOSPITAL ENCOUNTER (OUTPATIENT)
Dept: PHYSICAL THERAPY | Age: 22
Discharge: HOME | End: 2022-12-19
Payer: MEDICAID

## 2022-12-19 PROCEDURE — 97530 THERAPEUTIC ACTIVITIES: CPT

## 2022-12-19 PROCEDURE — 97110 THERAPEUTIC EXERCISES: CPT

## 2022-12-19 PROCEDURE — 97112 NEUROMUSCULAR REEDUCATION: CPT

## 2022-12-19 NOTE — PROGRESS NOTES
PHYSICAL THERAPY - DAILY TREATMENT NOTE    Patient Name: Rosas Johnson        Date: 2022  : 2000   YES Patient  Verified  Visit #:   10   of   15  Insurance: Payor: [address] / Plan: [address] [facility] / Product Type: Managed Care Medicaid /      In time: 7:00 Out time: 750   Total Treatment Time: 50     TREATMENT AREA =  Other low back pain [M54.59]    SUBJECTIVE  Pain Level (on 0 to 10 scale):  0  / 10   Medication Changes/New allergies or changes in medical history, any new surgeries or procedures? NO If yes, update Summary List   Subjective Functional Status/Changes:  []  No changes reported     Reports she ran a [address] (mostly walked and cut it short, she ran for 2 minutes or so), didn't finish the run bc out of breath. The body is sore, the back is a bit sore. OBJECTIVE  10 min Therapeutic Exercise:  [x]  See flow sheet   Rationale:      increase ROM to improve the patients ability to improve muscle tension/pain     15 min Therapeutic Activity: [x]  See flow sheet   Rationale:    increase strength and improve coordination to improve the patients ability to turn, roll for ADL    15 min Neuromuscular Re-ed: [x]  See flow sheet   Rationale:    improve coordination and increase proprioception to improve the patients ability to control lumbopelvic positioning    - min Self Care: Activity modification, Pt Ed, HEP   Rationale:    improve coordination to improve the patients ability to make gains btw session    MHP to lumbar in supine w bolster, 10' post session. Billed With/As:   [] TE   [] TA   [] Neuro   [x] Self Care Patient Education: [x] Review HEP    [] Progressed/Changed HEP based on:   [x] Addressed barriers and behaviors     [] Therapeutic Neuroscience Pain Education, metaphor, reframing, contexts.     [] Sleep Education   [x] Body Mechanics [] Healing Timeframe     [] Self STM with ball at \"the spot\"  [x] Walking Program/Global Activity   [] other: Other Objective/Functional Measures:    See Flowsheet for drills, loads and volume. Post Treatment Pain Level (on 0 to 10) scale:   0  / 10     ASSESSMENT  Assessment/Changes in Function:     Coordination a higher challenge than strength at this time. Significant challenge in hip hinge/DL, pt is quad and knee dominant for bending and prefers a flexed lumbar/trunk. Max level VCs, TCs, demo. Discussed running progression of 10% adding and to use a track or TM to find a starting position and progress from there. []  See Progress Note/Recertification   Patient will continue to benefit from skilled PT services to modify and progress therapeutic interventions, address functional mobility deficits, address ROM deficits, address strength deficits, analyze and address soft tissue restrictions, analyze and cue movement patterns, analyze and modify body mechanics/ergonomics, and assess and modify postural abnormalities to attain remaining goals. Progress toward goals / Updated goals:    Short Term Goals: To be accomplished in  3  weeks:  Patient will demonstrate independence with HEP for self management of symptoms. MET to date  Patient will report reduction in pain at worst to 3-4/10 in order to improve tolerance to sitting activities. 0-3/10 range MET (12/5/22)  Long Term Goals: To be accomplished in  6  weeks:  Patient will improve FOTO to >/= 70/100 in order to improve quality of life. MET 70/100 (12/5/22) was 65/100 at [address])  Patient will report reduction in pain at worst to 1-2/10 in order to improve tolerance to walking activities. Progressing   Patient will demonstrate B hip strength to 5/5 for all planes in order to improve tolerance to lifting activities.  NT    NEW GOAL:  Pt to demonstrate a safe/effective/effiecient deadlift with 30+lbs x5 to indicate function in home and life     PLAN  [x]  Upgrade activities as tolerated YES Continue plan of care   []  Discharge due to :    []  Other: Therapist: Saleem Faria, PT DPT [facility]    Date: 12/19/2022 Time: 4:52 PM     Future Appointments   Date Time Provider Sabina Beauchamp   12/23/2022  7:00 AM Sandra Pelayo  [address] [facility]   12/30/2022  2:50 PM Sandra Pelayo  [address] [facility]   4/28/2023  8:00 AM Sheree Street MD [facility]

## 2022-12-23 ENCOUNTER — HOSPITAL ENCOUNTER (OUTPATIENT)
Dept: PHYSICAL THERAPY | Age: 22
Discharge: HOME | End: 2022-12-23
Payer: MEDICAID

## 2022-12-23 PROCEDURE — 97112 NEUROMUSCULAR REEDUCATION: CPT

## 2022-12-23 PROCEDURE — 97530 THERAPEUTIC ACTIVITIES: CPT

## 2022-12-23 PROCEDURE — 97110 THERAPEUTIC EXERCISES: CPT

## 2022-12-23 NOTE — PROGRESS NOTES
PHYSICAL THERAPY - DAILY TREATMENT NOTE    Patient Name: Cory Begum        Date: 2022  : 2000   YES Patient  Verified  Visit #:      15  Insurance: Payor: [address] / Plan: [address] [facility] / Product Type: Managed Care Medicaid /      In time: 7:00 Out time: 750   Total Treatment Time: 50     TREATMENT AREA =  Other low back pain [M54.59]    SUBJECTIVE  Pain Level (on 0 to 10 scale):  0  / 10   Medication Changes/New allergies or changes in medical history, any new surgeries or procedures? NO If yes, update Summary List   Subjective Functional Status/Changes:  []  No changes reported     Say feeling good today. She does not have plans to do any running. OBJECTIVE  10 min Therapeutic Exercise:  [x]  See flow sheet   Rationale:      increase ROM to improve the patients ability to improve muscle tension/pain     15 min Therapeutic Activity: [x]  See flow sheet   Rationale:    increase strength and improve coordination to improve the patients ability to turn, roll for ADL    15 min Neuromuscular Re-ed: [x]  See flow sheet   Rationale:    improve coordination and increase proprioception to improve the patients ability to control lumbopelvic positioning    - min Self Care: Activity modification, Pt Ed, HEP   Rationale:    improve coordination to improve the patients ability to make gains btw session    MHP to lumbar in supine w bolster, 10' post session. Billed With/As:   [] TE   [] TA   [] Neuro   [x] Self Care Patient Education: [x] Review HEP    [] Progressed/Changed HEP based on:   [x] Addressed barriers and behaviors     [] Therapeutic Neuroscience Pain Education, metaphor, reframing, contexts. [] Sleep Education   [x] Body Mechanics [] Healing Timeframe     [] Self STM with ball at \"the spot\"  [x] Walking Program/Global Activity   [] other:      Other Objective/Functional Measures:    See Flowsheet for drills, loads and volume.      Post Treatment Pain Level (on 0 to 10) scale:   0  / 10     ASSESSMENT  Assessment/Changes in Function:     COntinued with strength POC for LEs, hips, core. High focus on motor control and positioning as coordination remains high challenge. Poor glute and core control overall. []  See Progress Note/Recertification   Patient will continue to benefit from skilled PT services to modify and progress therapeutic interventions, address functional mobility deficits, address ROM deficits, address strength deficits, analyze and address soft tissue restrictions, analyze and cue movement patterns, analyze and modify body mechanics/ergonomics, and assess and modify postural abnormalities to attain remaining goals. Progress toward goals / Updated goals:    Short Term Goals: To be accomplished in  3  weeks:  Patient will demonstrate independence with HEP for self management of symptoms. MET to date  Patient will report reduction in pain at worst to 3-4/10 in order to improve tolerance to sitting activities. 0-3/10 range MET (12/5/22)  Long Term Goals: To be accomplished in  6  weeks:  Patient will improve FOTO to >/= 70/100 in order to improve quality of life. MET 70/100 (12/5/22) was 65/100 at [facility])  Patient will report reduction in pain at worst to 1-2/10 in order to improve tolerance to walking activities. Progressing   Patient will demonstrate B hip strength to 5/5 for all planes in order to improve tolerance to lifting activities.  NT    NEW GOAL:  Pt to demonstrate a safe/effective/effiecient deadlift with 30+lbs x5 to indicate function in home and life     PLAN  [x]  Upgrade activities as tolerated YES Continue plan of care   []  Discharge due to :    []  Other:      Therapist: Donold Curling, PT DPT [facility]    Date: 12/23/2022 Time: 4:52 PM     Future Appointments   Date Time Provider Sabina Beauchamp   4/28/2023  8:00 AM Juma Amin MD [facility]

## 2022-12-27 ENCOUNTER — APPOINTMENT (OUTPATIENT)
Dept: PHYSICAL THERAPY | Age: 22
End: 2022-12-27
Payer: MEDICAID

## 2022-12-30 ENCOUNTER — APPOINTMENT (OUTPATIENT)
Dept: PHYSICAL THERAPY | Age: 22
End: 2022-12-30
Payer: MEDICAID

## 2023-01-19 ENCOUNTER — HOSPITAL ENCOUNTER (OUTPATIENT)
Dept: PHYSICAL THERAPY | Age: 23
Discharge: HOME | End: 2023-01-19
Payer: MEDICAID

## 2023-01-19 PROCEDURE — 97112 NEUROMUSCULAR REEDUCATION: CPT

## 2023-01-19 PROCEDURE — 97110 THERAPEUTIC EXERCISES: CPT

## 2023-01-19 PROCEDURE — 97530 THERAPEUTIC ACTIVITIES: CPT

## 2023-01-19 NOTE — PROGRESS NOTES
PHYSICAL THERAPY - DAILY TREATMENT NOTE    Patient Name: Abida Rome        Date: 2023  : 2000   YES Patient  Verified  Visit #:   12   of   15  Insurance: Payor: [address] [facility] / Plan: [address] [facility] / Product Type: Managed Care Medicaid /      In time: 11:30 Out time: 12:00   Total Treatment Time: 30     TREATMENT AREA =  Other low back pain [M54.59]    SUBJECTIVE  Pain Level (on 0 to 10 scale):  0  / 10   Medication Changes/New allergies or changes in medical history, any new surgeries or procedures? NO If yes, update Summary List   Subjective Functional Status/Changes:  []  No changes reported     Reports when the back hurts she's doing the HEP. Says not doing the HEP too much otherwise. Says not having much pain except when laying on her back (lumbar ext). OBJECTIVE  10 min Therapeutic Exercise:  [x]  See flow sheet   Rationale:      increase ROM to improve the patients ability to improve muscle tension/pain     10 min Therapeutic Activity: [x]  See flow sheet   Rationale:    increase strength and improve coordination to improve the patients ability to turn, roll for ADL    10 min Neuromuscular Re-ed: [x]  See flow sheet   Rationale:    improve coordination and increase proprioception to improve the patients ability to control lumbopelvic positioning    - min Self Care: Activity modification, Pt Ed, HEP   Rationale:    improve coordination to improve the patients ability to make gains btw session    MHP to lumbar in supine w bolster, 10' post session. Billed With/As:   [] TE   [] TA   [] Neuro   [x] Self Care Patient Education: [x] Review HEP    [] Progressed/Changed HEP based on:   [x] Addressed barriers and behaviors     [] Therapeutic Neuroscience Pain Education, metaphor, reframing, contexts.     [] Sleep Education   [x] Body Mechanics [] Healing Timeframe     [] Self STM with ball at \"the spot\"  [x] Walking Program/Global Activity   [] other:      Other Objective/Functional Measures:    See Flowsheet for drills, loads and volume. Post Treatment Pain Level (on 0 to 10) scale:   0  / 10     ASSESSMENT  Assessment/Changes in Function:     Pt not seen for 3 weeks. Pt continues to have signiuficant tightness in the hips and weakness at the core which affects her movement quality. Pt unable to coordinate a neutral spine during a hip lizett due to HS tightness and trunk weakness. Drills completed in session to address this and HEP re-emphasized. POC for 3 more visits, 1x/wk. []  See Progress Note/Recertification   Patient will continue to benefit from skilled PT services to modify and progress therapeutic interventions, address functional mobility deficits, address ROM deficits, address strength deficits, analyze and address soft tissue restrictions, analyze and cue movement patterns, analyze and modify body mechanics/ergonomics, and assess and modify postural abnormalities to attain remaining goals. Progress toward goals / Updated goals:    Short Term Goals: To be accomplished in  3  weeks:  Patient will demonstrate independence with HEP for self management of symptoms. MET to date  Patient will report reduction in pain at worst to 3-4/10 in order to improve tolerance to sitting activities. 0-3/10 range MET (12/5/22)  Long Term Goals: To be accomplished in  6  weeks:  Patient will improve FOTO to >/= 70/100 in order to improve quality of life. MET 70/100 (12/5/22) was 65/100 at [facility])  Patient will report reduction in pain at worst to 1-2/10 in order to improve tolerance to walking activities. Progressing   Patient will demonstrate B hip strength to 5/5 for all planes in order to improve tolerance to lifting activities.    Flexion: 5/5 bilat  ABD: L: 5/5, R: 4/5  Ext: 4-/5 bilat    NEW GOAL:  Pt to demonstrate a safe/effective/effiecient deadlift with 30+lbs x5 to indicate function in home and life: Not met, pt with difficulty in coordinating safe form for lifting.      PLAN  [x]  Upgrade activities as tolerated YES Continue plan of care   []  Discharge due to :    []  Other:      Therapist: Lotus Vides, PT DPT [facility]    Date: 1/19/2023 Time: 4:52 PM     Future Appointments   Date Time Provider Sabina Beauchamp   1/19/2023 11:30 AM Naga Prabhakar PT [facility]   1/27/2023  1:30 PM Naga Prabhakar PT [facility]   4/28/2023  8:00 AM Wyatt Ruffin MD [facility]

## 2023-01-19 NOTE — THERAPY RECERTIFICATION
[address] [facility] PHYSICAL THERAPY  [address] [facility] [address],[place], [facility] - Phone: [phone]  Fax: [phone]  PROGRESS NOTE  Patient Name: Camelia Loving : 2000   Treatment/Medical Diagnosis: Other low back pain [M54.59]   Referral Source: aRdha Ayala,*     Date of Initial Visit: 22 Attended Visits: 12 Missed Visits: 1     SUMMARY OF TREATMENT  Pt has been evaluated/assessed and participated in skilled therapeutic exercise, functional activity training, neuromuscular re-ed, manual therapy interventions, pt ed, self care, activity modification/progression. Subjective: Reports when the back hurts she's doing the HEP. Says not doing the HEP too much otherwise. Says not having much pain except when laying on her back (lumbar ext). CURRENT STATUS  Pt has been seen at [facility], [facility] to address Other low back pain [M54.59] . Pt not seen for 3 weeks. Pt continues to have signiuficant tightness in the hips and weakness at the core which affects her movement quality. Pt unable to coordinate a neutral spine during a hip lizett due to HS tightness and trunk weakness. This affects her home chore ability and her ability to complete healthy exercise with good form. Drills completed in session to address this and HEP re-emphasized. POC for 3 more visits, 1x/wk. Long Term Goals:  Patient will report reduction in pain at worst to 1-2/10 in order to improve tolerance to walking activities. Progressing   Patient will demonstrate B hip strength to 5/5 for all planes in order to improve tolerance to lifting activities. Flexion: 5/5 bilat  ABD: L: 5/5, R: 4/5  Ext: 4-/5 bilat     NEW GOAL:  Pt to demonstrate a safe/effective/effiecient deadlift with 30+lbs x5 to indicate function in home and life: Not met, pt with difficulty in coordinating safe form for lifting.     RECOMMENDATIONS  *Continue with current progressive POC towards stated Goals    If you have any questions/comments please contact us directly at [phone]. Thank you for allowing us to assist in the care of your patient. Therapist Signature: Al De La O PT DPT [facility] Date: 1/19/2023     Time: 12:01 PM   NOTE TO PHYSICIAN:  PLEASE COMPLETE THE ORDERS BELOW AND FAX TO   [facility]: [phone]  If you are unable to process this request in 24 hours please contact our office: [phone]    ___ I have read the above report and request that my patient continue as recommended.   ___ I have read the above report and request that my patient continue therapy with the following changes/special instructions:_________________________________________________________   ___ I have read the above report and request that my patient be discharged from therapy.      Physician Signature:        Date:       Time:                                 Robbie De Santiago,*

## 2023-01-25 ENCOUNTER — APPOINTMENT (OUTPATIENT)
Dept: PHYSICAL THERAPY | Age: 23
End: 2023-01-25
Payer: MEDICAID

## 2023-01-27 ENCOUNTER — APPOINTMENT (OUTPATIENT)
Dept: PHYSICAL THERAPY | Age: 23
End: 2023-01-27
Payer: MEDICAID

## 2023-02-02 ENCOUNTER — HOSPITAL ENCOUNTER (OUTPATIENT)
Dept: PHYSICAL THERAPY | Age: 23
Discharge: HOME | End: 2023-02-02
Payer: MEDICAID

## 2023-02-02 NOTE — PROGRESS NOTES
PHYSICAL THERAPY - DAILY TREATMENT NOTE    Patient Name: Laurence Nash        Date: 2023  : 2000   YES Patient  Verified  Visit #:   15   of   15  Insurance: Payor: [address] [facility] / Plan: [address] [facility] / Product Type: Managed Care Medicaid /      In time: 5:00 Out time: 5:30   Total Treatment Time: 30     TREATMENT AREA =  Other low back pain [M54.59]    SUBJECTIVE  Pain Level (on 0 to 10 scale):  0  / 10   Medication Changes/New allergies or changes in medical history, any new surgeries or procedures? NO If yes, update Summary List   Subjective Functional Status/Changes:  []  No changes reported     ***       OBJECTIVE  10 min Therapeutic Exercise:  [x]  See flow sheet   Rationale:      increase ROM to improve the patients ability to improve muscle tension/pain     10 min Therapeutic Activity: [x]  See flow sheet   Rationale:    increase strength and improve coordination to improve the patients ability to turn, roll for ADL    10 min Neuromuscular Re-ed: [x]  See flow sheet   Rationale:    improve coordination and increase proprioception to improve the patients ability to control lumbopelvic positioning    - min Self Care: Activity modification, Pt Ed, HEP   Rationale:    improve coordination to improve the patients ability to make gains btw session    MHP to lumbar in supine w bolster, 10' post session. Billed With/As:   [] TE   [] TA   [] Neuro   [x] Self Care Patient Education: [x] Review HEP    [] Progressed/Changed HEP based on:   [x] Addressed barriers and behaviors     [] Therapeutic Neuroscience Pain Education, metaphor, reframing, contexts. [] Sleep Education   [x] Body Mechanics [] Healing Timeframe     [] Self STM with ball at \"the spot\"  [x] Walking Program/Global Activity   [] other:      Other Objective/Functional Measures:    See Flowsheet for drills, loads and volume.      Post Treatment Pain Level (on 0 to 10) scale:   0  / 10 ASSESSMENT  Assessment/Changes in Function:     ***     []  See Progress Note/Recertification   Patient will continue to benefit from skilled PT services to modify and progress therapeutic interventions, address functional mobility deficits, address ROM deficits, address strength deficits, analyze and address soft tissue restrictions, analyze and cue movement patterns, analyze and modify body mechanics/ergonomics, and assess and modify postural abnormalities to attain remaining goals. Progress toward goals / Updated goals:    Short Term Goals: To be accomplished in  3  weeks:  Patient will demonstrate independence with HEP for self management of symptoms. MET to date  Patient will report reduction in pain at worst to 3-4/10 in order to improve tolerance to sitting activities. 0-3/10 range MET (12/5/22)  Long Term Goals: To be accomplished in  6  weeks:  Patient will improve FOTO to >/= 70/100 in order to improve quality of life. MET 70/100 (12/5/22) was 65/100 at [facility])  Patient will report reduction in pain at worst to 1-2/10 in order to improve tolerance to walking activities. Progressing   Patient will demonstrate B hip strength to 5/5 for all planes in order to improve tolerance to lifting activities. Flexion: 5/5 bilat  ABD: L: 5/5, R: 4/5  Ext: 4-/5 bilat    NEW GOAL:  Pt to demonstrate a safe/effective/effiecient deadlift with 30+lbs x5 to indicate function in home and life: Not met, pt with difficulty in coordinating safe form for lifting.      PLAN  [x]  Upgrade activities as tolerated YES Continue plan of care   []  Discharge due to :    []  Other:      Therapist: Jackie York, PT DPT [facility]    Date: 2/2/2023 Time: 4:52 PM     Future Appointments   Date Time Provider Sabina Beauchamp   2/2/2023  5:00 PM Marquis Truong Chairez, PT [id number]

## 2023-02-03 ENCOUNTER — HOSPITAL ENCOUNTER (OUTPATIENT)
Dept: PHYSICAL THERAPY | Age: 23
Discharge: HOME | End: 2023-02-03
Payer: MEDICAID

## 2023-02-03 PROCEDURE — 97112 NEUROMUSCULAR REEDUCATION: CPT

## 2023-02-03 PROCEDURE — 97110 THERAPEUTIC EXERCISES: CPT

## 2023-02-03 PROCEDURE — 97530 THERAPEUTIC ACTIVITIES: CPT

## 2023-02-03 NOTE — PROGRESS NOTES
PHYSICAL THERAPY - DAILY TREATMENT NOTE    Patient Name: Enedelia Larsen        Date: 2/3/2023  : 2000   YES Patient  Verified  Visit #:   15   of   15  Insurance: Payor: [address] / Plan: [address] [facility] / Product Type: Managed Care Medicaid /      In time: 1:30 Out time: 2:00   Total Treatment Time: 30     TREATMENT AREA =  Other low back pain [M54.59]    SUBJECTIVE  Pain Level (on 0 to 10 scale):  0  / 10   Medication Changes/New allergies or changes in medical history, any new surgeries or procedures? NO If yes, update Summary List   Subjective Functional Status/Changes:  []  No changes reported     Says she is doing fine. Biggest trouble at home is picking things up. OBJECTIVE  10 min Therapeutic Exercise:  [x]  See flow sheet   Rationale:      increase ROM to improve the patients ability to improve muscle tension/pain     10 min Therapeutic Activity: [x]  See flow sheet   Rationale:    increase strength and improve coordination to improve the patients ability to turn, roll for ADL    10 min Neuromuscular Re-ed: [x]  See flow sheet   Rationale:    improve coordination and increase proprioception to improve the patients ability to control lumbopelvic positioning    - min Self Care: Activity modification, Pt Ed, HEP   Rationale:    improve coordination to improve the patients ability to make gains btw session    MHP to lumbar in supine w bolster, 10' post session. Billed With/As:   [] TE   [] TA   [] Neuro   [x] Self Care Patient Education: [x] Review HEP    [] Progressed/Changed HEP based on:   [x] Addressed barriers and behaviors     [] Therapeutic Neuroscience Pain Education, metaphor, reframing, contexts. [] Sleep Education   [x] Body Mechanics [] Healing Timeframe     [] Self STM with ball at \"the spot\"  [x] Walking Program/Global Activity   [] other:      Other Objective/Functional Measures:    See Flowsheet for drills, loads and volume.     Standing hip hinge ability: 30 deg. Post Treatment Pain Level (on 0 to 10) scale:   0  / 10     ASSESSMENT  Assessment/Changes in Function:     Pt continues to have poor neuromotor control of the lumbopelvic area, difficulty in APT/PPT and hip hinge. She has poor HS length and hip flexor length as well as poor ankle motion. Her strength and core control is poor as noted with high challenge in inch rise from surface (squat hold): pt hips cave in and she must rise to a higher elevation vs hold this lower position. Pt reports she has a hard time doing her HEP, difficulty with consistency. I encouraged her to get strong and be consistent. []  See Progress Note/Recertification   Patient will continue to benefit from skilled PT services to modify and progress therapeutic interventions, address functional mobility deficits, address ROM deficits, address strength deficits, analyze and address soft tissue restrictions, analyze and cue movement patterns, analyze and modify body mechanics/ergonomics, and assess and modify postural abnormalities to attain remaining goals. Progress toward goals / Updated goals:    Short Term Goals: To be accomplished in  3  weeks:  Patient will demonstrate independence with HEP for self management of symptoms. MET to date  Patient will report reduction in pain at worst to 3-4/10 in order to improve tolerance to sitting activities. 0-3/10 range MET (12/5/22)  Long Term Goals: To be accomplished in  6  weeks:  Patient will improve FOTO to >/= 70/100 in order to improve quality of life. MET 70/100 (12/5/22) was 65/100 at [facility])  Patient will report reduction in pain at worst to 1-2/10 in order to improve tolerance to walking activities. Progressing   Patient will demonstrate B hip strength to 5/5 for all planes in order to improve tolerance to lifting activities.    Flexion: 5/5 bilat  ABD: L: 5/5, R: 4/5  Ext: 4-/5 bilat    NEW GOAL:  Pt to demonstrate a safe/effective/effiecient deadlift with 30+lbs x5 to indicate function in home and life: Not met, pt with difficulty in coordinating safe form for lifting.      PLAN  [x]  Upgrade activities as tolerated YES Continue plan of care   []  Discharge due to :    []  Other:      Therapist: Maru Larry, PT DPT [facility]    Date: 2/3/2023 Time: 4:52 PM     Future Appointments   Date Time Provider Sabina Beauchamp   2/3/2023  1:30 PM Sumaya Chairez, PT [id number]

## 2023-04-28 ENCOUNTER — OFFICE VISIT (OUTPATIENT)
Facility: CLINIC | Age: 23
End: 2023-04-28
Payer: MEDICAID

## 2023-04-28 VITALS
TEMPERATURE: 97.6 F | DIASTOLIC BLOOD PRESSURE: 62 MMHG | HEART RATE: 88 BPM | RESPIRATION RATE: 12 BRPM | OXYGEN SATURATION: 97 % | SYSTOLIC BLOOD PRESSURE: 106 MMHG | BODY MASS INDEX: 31.99 KG/M2 | HEIGHT: 67 IN | WEIGHT: 203.8 LBS

## 2023-04-28 DIAGNOSIS — Z11.4 SCREENING FOR HIV (HUMAN IMMUNODEFICIENCY VIRUS): Primary | ICD-10-CM

## 2023-04-28 DIAGNOSIS — Z13.29 SCREENING FOR THYROID DISORDER: ICD-10-CM

## 2023-04-28 DIAGNOSIS — Z13.6 ENCOUNTER FOR SCREENING FOR CORONARY ARTERY DISEASE: ICD-10-CM

## 2023-04-28 DIAGNOSIS — Z13.1 SCREENING FOR DIABETES MELLITUS (DM): ICD-10-CM

## 2023-04-28 DIAGNOSIS — Z13.228 SCREENING FOR METABOLIC DISORDER: ICD-10-CM

## 2023-04-28 DIAGNOSIS — Z23 ENCOUNTER FOR VACCINATION: ICD-10-CM

## 2023-04-28 DIAGNOSIS — Z13.0 SCREENING FOR IRON DEFICIENCY ANEMIA: ICD-10-CM

## 2023-04-28 PROCEDURE — 90471 IMMUNIZATION ADMIN: CPT | Performed by: INTERNAL MEDICINE

## 2023-04-28 PROCEDURE — 90715 TDAP VACCINE 7 YRS/> IM: CPT | Performed by: INTERNAL MEDICINE

## 2023-04-28 PROCEDURE — 99213 OFFICE O/P EST LOW 20 MIN: CPT | Performed by: INTERNAL MEDICINE

## 2023-04-28 SDOH — ECONOMIC STABILITY: FOOD INSECURITY: WITHIN THE PAST 12 MONTHS, THE FOOD YOU BOUGHT JUST DIDN'T LAST AND YOU DIDN'T HAVE MONEY TO GET MORE.: NEVER TRUE

## 2023-04-28 SDOH — ECONOMIC STABILITY: HOUSING INSECURITY
IN THE LAST 12 MONTHS, WAS THERE A TIME WHEN YOU DID NOT HAVE A STEADY PLACE TO SLEEP OR SLEPT IN A SHELTER (INCLUDING NOW)?: NO

## 2023-04-28 SDOH — ECONOMIC STABILITY: FOOD INSECURITY: WITHIN THE PAST 12 MONTHS, YOU WORRIED THAT YOUR FOOD WOULD RUN OUT BEFORE YOU GOT MONEY TO BUY MORE.: NEVER TRUE

## 2023-04-28 SDOH — ECONOMIC STABILITY: INCOME INSECURITY: HOW HARD IS IT FOR YOU TO PAY FOR THE VERY BASICS LIKE FOOD, HOUSING, MEDICAL CARE, AND HEATING?: NOT HARD AT ALL

## 2023-04-28 ASSESSMENT — PATIENT HEALTH QUESTIONNAIRE - PHQ9
1. LITTLE INTEREST OR PLEASURE IN DOING THINGS: 0
SUM OF ALL RESPONSES TO PHQ QUESTIONS 1-9: 0
SUM OF ALL RESPONSES TO PHQ9 QUESTIONS 1 & 2: 0
2. FEELING DOWN, DEPRESSED OR HOPELESS: 0

## 2023-04-28 ASSESSMENT — ENCOUNTER SYMPTOMS
ABDOMINAL PAIN: 0
DIARRHEA: 0
CONSTIPATION: 0
SHORTNESS OF BREATH: 0
COUGH: 0

## 2023-04-28 NOTE — PROGRESS NOTES
Manuel Aguirre is a 26 y.o.  female presents today for office visit for   Chief Complaint   Patient presents with    Follow-up Chronic Condition   . 1. \"Have you been to the ER, urgent care clinic since your last visit? Hospitalized since your last visit? \" 4/18/2023 [facility] for urinary symptoms. Prescribed Keflex 500 mg x 7 days. 2. \"Have you seen or consulted any other health care providers outside of the [address] [place] [address] since your last visit? \" No     3. For patients aged 39-70: Has the patient had a colonoscopy / FIT/ Cologuard? N/A     If the patient is female:    4. For patients aged 41-77: Has the patient had a mammogram within the past 2 years? N/A    5. For patients aged 21-65: Has the patient had a pap smear?  No

## 2023-04-28 NOTE — PROGRESS NOTES
Subjective:      Patient ID: Kellee Murphy is a 26 y.o. female. Annual exam      Patient has no complaints or concerns. About 2 weeks ago she went to the emergency for a UTI and was given antibiotics now her symptoms have resolved completely. She denies nausea vomiting, no fever chills sweats chest pain shortness of breath, no abdominal pain, no burning sensation with urination, no polyuria. I advised her to give us a call to the clinic next time she has UTI symptoms so we can prevent her from going to the ED. PAST MEDICAL HISTORY  Medical: History of iron deficiency in the past.  Surgical: Denied. Allergies: Denied. Medication: Denied. 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 Social: Tobacco denied, alcohol denied, recreational drugs marijuana only once. Sexual: Single, no partner, no children, STI in the past trichomonas treated, no birth control. Review of Systems   Constitutional:  Negative for chills and fever. HENT:  Negative for sneezing. Respiratory:  Negative for cough and shortness of breath. Cardiovascular:  Negative for chest pain and palpitations. Gastrointestinal:  Negative for abdominal pain, constipation and diarrhea. Objective:   Visit Vitals  /62   Pulse 88   Temp 97.6 °F (36.4 °C) (Temporal)   Resp 12   Ht 5' 6.93\" (1.7 m)   Wt 203 lb 12.8 oz (92.4 kg)   SpO2 97%   BMI 31.99 kg/m²        Physical Exam  Constitutional:       Appearance: Normal appearance. HENT:      Mouth/Throat:      Mouth: Mucous membranes are moist.   Eyes:      Pupils: Pupils are equal, round, and reactive to light. Cardiovascular:      Rate and Rhythm: Normal rate and regular rhythm. Pulmonary:      Effort: Pulmonary effort is normal.      Breath sounds: Normal breath sounds. Abdominal:      General: Abdomen is flat. Bowel sounds are normal.      Palpations: Abdomen is soft. Tenderness: There is no abdominal tenderness.  There is no right CVA

## 2023-04-29 LAB
ALBUMIN SERPL-MCNC: 3.9 G/DL (ref 3.9–5)
ALBUMIN/GLOB SERPL: 1 {RATIO} (ref 1.2–2.2)
ALP SERPL-CCNC: 76 IU/L (ref 44–121)
ALT SERPL-CCNC: 11 IU/L (ref 0–32)
AST SERPL-CCNC: 18 IU/L (ref 0–40)
BASOPHILS # BLD AUTO: 0 X10E3/UL (ref 0–0.2)
BASOPHILS NFR BLD AUTO: 0 %
BILIRUB SERPL-MCNC: 0.9 MG/DL (ref 0–1.2)
BUN SERPL-MCNC: 8 MG/DL (ref 6–20)
BUN/CREAT SERPL: 9 (ref 9–23)
CALCIUM SERPL-MCNC: 9.7 MG/DL (ref 8.7–10.2)
CHLORIDE SERPL-SCNC: 103 MMOL/L (ref 96–106)
CHOLEST SERPL-MCNC: 169 MG/DL (ref 100–199)
CO2 SERPL-SCNC: 21 MMOL/L (ref 20–29)
CREAT SERPL-MCNC: 0.87 MG/DL (ref 0.57–1)
EGFRCR SERPLBLD CKD-EPI 2021: 97 ML/MIN/1.73
EOSINOPHIL # BLD AUTO: 0.1 X10E3/UL (ref 0–0.4)
EOSINOPHIL NFR BLD AUTO: 1 %
ERYTHROCYTE [DISTWIDTH] IN BLOOD BY AUTOMATED COUNT: 14.2 % (ref 11.7–15.4)
GLOBULIN SER CALC-MCNC: 3.9 G/DL (ref 1.5–4.5)
GLUCOSE SERPL-MCNC: 85 MG/DL (ref 70–99)
HBA1C MFR BLD: 5.3 % (ref 4.8–5.6)
HCT VFR BLD AUTO: 33.3 % (ref 34–46.6)
HDLC SERPL-MCNC: 59 MG/DL
HGB BLD-MCNC: 10.1 G/DL (ref 11.1–15.9)
HIV 1+2 AB+HIV1 P24 AG SERPL QL IA: NON REACTIVE
IMM GRANULOCYTES # BLD AUTO: 0 X10E3/UL (ref 0–0.1)
IMM GRANULOCYTES NFR BLD AUTO: 0 %
LDLC SERPL CALC-MCNC: 99 MG/DL (ref 0–99)
LYMPHOCYTES # BLD AUTO: 1.9 X10E3/UL (ref 0.7–3.1)
LYMPHOCYTES NFR BLD AUTO: 28 %
MCH RBC QN AUTO: 25.4 PG (ref 26.6–33)
MCHC RBC AUTO-ENTMCNC: 30.3 G/DL (ref 31.5–35.7)
MCV RBC AUTO: 84 FL (ref 79–97)
MONOCYTES # BLD AUTO: 0.6 X10E3/UL (ref 0.1–0.9)
MONOCYTES NFR BLD AUTO: 10 %
NEUTROPHILS # BLD AUTO: 4.1 X10E3/UL (ref 1.4–7)
NEUTROPHILS NFR BLD AUTO: 61 %
PLATELET # BLD AUTO: 406 X10E3/UL (ref 150–450)
POTASSIUM SERPL-SCNC: 4.4 MMOL/L (ref 3.5–5.2)
PROT SERPL-MCNC: 7.8 G/DL (ref 6–8.5)
RBC # BLD AUTO: 3.98 X10E6/UL (ref 3.77–5.28)
SODIUM SERPL-SCNC: 140 MMOL/L (ref 134–144)
T4 FREE SERPL-MCNC: 1.3 NG/DL (ref 0.82–1.77)
TRIGL SERPL-MCNC: 58 MG/DL (ref 0–149)
TSH SERPL DL<=0.005 MIU/L-ACNC: 1.23 UIU/ML (ref 0.45–4.5)
VLDLC SERPL CALC-MCNC: 11 MG/DL (ref 5–40)
WBC # BLD AUTO: 6.8 X10E3/UL (ref 3.4–10.8)

## 2023-05-01 DIAGNOSIS — D64.9 ANEMIA, UNSPECIFIED TYPE: Primary | ICD-10-CM

## 2023-06-28 ENCOUNTER — OFFICE VISIT (OUTPATIENT)
Facility: CLINIC | Age: 23
End: 2023-06-28
Payer: MEDICAID

## 2023-06-28 VITALS
DIASTOLIC BLOOD PRESSURE: 75 MMHG | BODY MASS INDEX: 32.33 KG/M2 | HEART RATE: 82 BPM | WEIGHT: 206 LBS | OXYGEN SATURATION: 97 % | SYSTOLIC BLOOD PRESSURE: 114 MMHG | TEMPERATURE: 97 F | RESPIRATION RATE: 18 BRPM | HEIGHT: 67 IN

## 2023-06-28 DIAGNOSIS — D64.9 ANEMIA, UNSPECIFIED TYPE: ICD-10-CM

## 2023-06-28 DIAGNOSIS — Z12.4 SCREENING FOR MALIGNANT NEOPLASM OF CERVIX: Primary | ICD-10-CM

## 2023-06-28 PROCEDURE — 99213 OFFICE O/P EST LOW 20 MIN: CPT | Performed by: INTERNAL MEDICINE

## 2023-06-28 ASSESSMENT — ENCOUNTER SYMPTOMS
CONSTIPATION: 0
DIARRHEA: 0
ABDOMINAL PAIN: 0
COUGH: 0
SHORTNESS OF BREATH: 0

## 2023-07-04 LAB
., LABCORP: NORMAL
C TRACH RRNA CVX QL NAA+PROBE: NEGATIVE
CYTOLOGIST CVX/VAG CYTO: NORMAL
CYTOLOGY CVX/VAG DOC CYTO: NORMAL
CYTOLOGY CVX/VAG DOC THIN PREP: NORMAL
DX ICD CODE: NORMAL
Lab: NORMAL
N GONORRHOEA RRNA CVX QL NAA+PROBE: NEGATIVE
OTHER STN SPEC: NORMAL
STAT OF ADQ CVX/VAG CYTO-IMP: NORMAL
T VAGINALIS RRNA SPEC QL NAA+PROBE: NEGATIVE

## 2023-09-28 ENCOUNTER — OFFICE VISIT (OUTPATIENT)
Facility: CLINIC | Age: 23
End: 2023-09-28
Payer: MEDICAID

## 2023-09-28 VITALS
WEIGHT: 209 LBS | HEART RATE: 90 BPM | HEIGHT: 67 IN | DIASTOLIC BLOOD PRESSURE: 70 MMHG | RESPIRATION RATE: 18 BRPM | BODY MASS INDEX: 32.8 KG/M2 | OXYGEN SATURATION: 98 % | SYSTOLIC BLOOD PRESSURE: 104 MMHG | TEMPERATURE: 97.7 F

## 2023-09-28 DIAGNOSIS — R35.89 FREQUENCY OF URINATION AND POLYURIA: Primary | ICD-10-CM

## 2023-09-28 DIAGNOSIS — R10.13 EPIGASTRIC PAIN: ICD-10-CM

## 2023-09-28 DIAGNOSIS — D64.9 ANEMIA, UNSPECIFIED TYPE: ICD-10-CM

## 2023-09-28 DIAGNOSIS — R35.0 FREQUENCY OF URINATION AND POLYURIA: Primary | ICD-10-CM

## 2023-09-28 DIAGNOSIS — N92.0 MENORRHAGIA WITH REGULAR CYCLE: ICD-10-CM

## 2023-09-28 PROCEDURE — 99213 OFFICE O/P EST LOW 20 MIN: CPT | Performed by: INTERNAL MEDICINE

## 2023-09-28 SDOH — ECONOMIC STABILITY: INCOME INSECURITY: HOW HARD IS IT FOR YOU TO PAY FOR THE VERY BASICS LIKE FOOD, HOUSING, MEDICAL CARE, AND HEATING?: NOT HARD AT ALL

## 2023-09-28 SDOH — ECONOMIC STABILITY: FOOD INSECURITY: WITHIN THE PAST 12 MONTHS, YOU WORRIED THAT YOUR FOOD WOULD RUN OUT BEFORE YOU GOT MONEY TO BUY MORE.: NEVER TRUE

## 2023-09-28 SDOH — ECONOMIC STABILITY: FOOD INSECURITY: WITHIN THE PAST 12 MONTHS, THE FOOD YOU BOUGHT JUST DIDN'T LAST AND YOU DIDN'T HAVE MONEY TO GET MORE.: NEVER TRUE

## 2023-09-28 ASSESSMENT — ENCOUNTER SYMPTOMS
CONSTIPATION: 0
SHORTNESS OF BREATH: 0
ABDOMINAL PAIN: 0
COUGH: 0
DIARRHEA: 0

## 2023-09-28 ASSESSMENT — PATIENT HEALTH QUESTIONNAIRE - PHQ9
SUM OF ALL RESPONSES TO PHQ QUESTIONS 1-9: 0
1. LITTLE INTEREST OR PLEASURE IN DOING THINGS: 0
SUM OF ALL RESPONSES TO PHQ9 QUESTIONS 1 & 2: 0
2. FEELING DOWN, DEPRESSED OR HOPELESS: 0
SUM OF ALL RESPONSES TO PHQ QUESTIONS 1-9: 0

## 2023-09-28 ASSESSMENT — ANXIETY QUESTIONNAIRES
5. BEING SO RESTLESS THAT IT IS HARD TO SIT STILL: 0
2. NOT BEING ABLE TO STOP OR CONTROL WORRYING: 0
IF YOU CHECKED OFF ANY PROBLEMS ON THIS QUESTIONNAIRE, HOW DIFFICULT HAVE THESE PROBLEMS MADE IT FOR YOU TO DO YOUR WORK, TAKE CARE OF THINGS AT HOME, OR GET ALONG WITH OTHER PEOPLE: NOT DIFFICULT AT ALL
3. WORRYING TOO MUCH ABOUT DIFFERENT THINGS: 0
7. FEELING AFRAID AS IF SOMETHING AWFUL MIGHT HAPPEN: 0
6. BECOMING EASILY ANNOYED OR IRRITABLE: 0
GAD7 TOTAL SCORE: 0
1. FEELING NERVOUS, ANXIOUS, OR ON EDGE: 0
4. TROUBLE RELAXING: 0

## 2023-09-29 DIAGNOSIS — D50.9 IRON DEFICIENCY ANEMIA, UNSPECIFIED IRON DEFICIENCY ANEMIA TYPE: Primary | ICD-10-CM

## 2023-09-29 LAB
A/G RATIO: 1.3 RATIO (ref 1.1–2.6)
ALBUMIN SERPL-MCNC: 4.4 G/DL (ref 3.5–5)
ALP BLD-CCNC: 84 U/L (ref 25–115)
ALT SERPL-CCNC: 9 U/L (ref 5–40)
ANION GAP SERPL CALCULATED.3IONS-SCNC: 9 MMOL/L (ref 3–15)
AST SERPL-CCNC: 13 U/L (ref 10–37)
BACTERIA: PRESENT
BILIRUB SERPL-MCNC: 0.3 MG/DL (ref 0.2–1.2)
BILIRUB SERPL-MCNC: NEGATIVE MG/DL
BLOOD: NEGATIVE
BUN BLDV-MCNC: 8 MG/DL (ref 6–22)
CALCIUM SERPL-MCNC: 9.2 MG/DL (ref 8.4–10.5)
CHLORIDE BLD-SCNC: 106 MMOL/L (ref 98–110)
CLARITY: ABNORMAL
CO2: 26 MMOL/L (ref 20–32)
COLOR: YELLOW
CREAT SERPL-MCNC: 0.8 MG/DL (ref 0.5–1.2)
EPITHELIAL CELLS: ABNORMAL /HPF
FERRITIN: 9 NG/ML (ref 10–291)
GLOBULIN: 3.5 G/DL (ref 2–4)
GLOMERULAR FILTRATION RATE: >60 ML/MIN/1.73 SQ.M.
GLUCOSE: 89 MG/DL (ref 70–99)
GLUCOSE: NEGATIVE MG/DL
HYALINE CASTS: ABNORMAL /LPF (ref 0–2)
IRON % SATURATION: ABNORMAL
IRON: <19 MCG/DL (ref 30–160)
KETONES, URINE: NEGATIVE MG/DL
LEUKOCYTE ESTERASE, URINE: ABNORMAL
LIPASE: <20 U/L (ref 7–60)
NITRITE, URINE: NEGATIVE
PH, URINE: 5.5 PH (ref 5–8)
POTASSIUM SERPL-SCNC: 4.4 MMOL/L (ref 3.5–5.5)
PROTEIN UA: NEGATIVE MG/DL
RBC URINE: >100 /HPF
SODIUM BLD-SCNC: 141 MMOL/L (ref 133–145)
SPECIFIC GRAVITY: 1.02 (ref 1–1.03)
TOTAL IRON BINDING CAPACITY: ABNORMAL
TOTAL PROTEIN: 7.9 G/DL (ref 6.4–8.3)
UIBC: 417 MCG/DL (ref 110–370)
UROBILINOGEN: 0.2 MG/DL
WBC UA: ABNORMAL /HPF (ref 0–5)
YEAST: PRESENT

## 2023-09-29 RX ORDER — FERROUS SULFATE 325(65) MG
325 TABLET ORAL
Qty: 90 TABLET | Refills: 0 | Status: SHIPPED | OUTPATIENT
Start: 2023-09-29

## 2023-12-07 ENCOUNTER — OFFICE VISIT (OUTPATIENT)
Facility: CLINIC | Age: 23
End: 2023-12-07
Payer: MEDICAID

## 2023-12-07 VITALS
OXYGEN SATURATION: 98 % | SYSTOLIC BLOOD PRESSURE: 117 MMHG | HEART RATE: 84 BPM | BODY MASS INDEX: 34.84 KG/M2 | HEIGHT: 66 IN | WEIGHT: 216.8 LBS | DIASTOLIC BLOOD PRESSURE: 73 MMHG | TEMPERATURE: 97.5 F | RESPIRATION RATE: 18 BRPM

## 2023-12-07 DIAGNOSIS — D50.9 IRON DEFICIENCY ANEMIA, UNSPECIFIED IRON DEFICIENCY ANEMIA TYPE: Primary | ICD-10-CM

## 2023-12-07 DIAGNOSIS — M54.9 ACUTE BILATERAL BACK PAIN, UNSPECIFIED BACK LOCATION: ICD-10-CM

## 2023-12-07 DIAGNOSIS — Z11.3 SCREEN FOR STD (SEXUALLY TRANSMITTED DISEASE): ICD-10-CM

## 2023-12-07 PROBLEM — D64.9 ANEMIA, UNSPECIFIED: Status: ACTIVE | Noted: 2021-02-02

## 2023-12-07 PROCEDURE — 99214 OFFICE O/P EST MOD 30 MIN: CPT | Performed by: INTERNAL MEDICINE

## 2023-12-07 RX ORDER — IBUPROFEN 200 MG
200 TABLET ORAL EVERY 8 HOURS PRN
Qty: 30 TABLET | Refills: 0 | Status: SHIPPED | OUTPATIENT
Start: 2023-12-07

## 2023-12-07 RX ORDER — CYCLOBENZAPRINE HCL 5 MG
5 TABLET ORAL NIGHTLY PRN
Qty: 7 TABLET | Refills: 0 | Status: SHIPPED | OUTPATIENT
Start: 2023-12-07 | End: 2023-12-14

## 2023-12-07 RX ORDER — METRONIDAZOLE 500 MG/1
500 TABLET ORAL 3 TIMES DAILY
COMMUNITY
Start: 2023-10-17

## 2023-12-07 ASSESSMENT — ENCOUNTER SYMPTOMS
ABDOMINAL PAIN: 1
BACK PAIN: 1

## 2023-12-07 NOTE — PROGRESS NOTES
1. \"Have you been to the ER, urgent care clinic since your last visit? Hospitalized since your last visit? \" No    2. \"Have you seen or consulted any other health care providers outside of the [address] [place] [address] since your last visit? \" No     3. For patients aged 43-73: Has the patient had a colonoscopy / FIT/ Cologuard? NA - based on age      If the patient is female:    4. For patients aged 43-66: Has the patient had a mammogram within the past 2 years? NA - based on age or sex      11. For patients aged 21-65: Has the patient had a pap smear?  Yes - no Care Gap present
Sarita Dang MD

## 2023-12-08 LAB
CHLAMYDIA AMPLIFIED URINE: NEGATIVE
GC AMPLIFIED URINE: NEGATIVE
HCT VFR BLD CALC: 35.1 % (ref 35.1–46.5)
HEMOGLOBIN: 10.7 G/DL (ref 11.7–15.5)
IRON % SATURATION: 7 % (ref 20–50)
IRON: 25 MCG/DL (ref 30–160)
TOTAL IRON BINDING CAPACITY: 374 MCG/DL (ref 228–428)
TRICH NUCU: NEGATIVE
UIBC: 349 MCG/DL (ref 110–370)

## 2023-12-22 DIAGNOSIS — D50.9 IRON DEFICIENCY ANEMIA, UNSPECIFIED IRON DEFICIENCY ANEMIA TYPE: ICD-10-CM

## 2023-12-26 RX ORDER — FERROUS SULFATE 325(65) MG
TABLET ORAL
Qty: 90 TABLET | Refills: 0 | Status: SHIPPED | OUTPATIENT
Start: 2023-12-27

## 2024-05-20 ENCOUNTER — OFFICE VISIT (OUTPATIENT)
Facility: CLINIC | Age: 24
End: 2024-05-20

## 2024-05-20 VITALS
SYSTOLIC BLOOD PRESSURE: 107 MMHG | HEIGHT: 67 IN | OXYGEN SATURATION: 95 % | BODY MASS INDEX: 34.78 KG/M2 | HEART RATE: 88 BPM | RESPIRATION RATE: 12 BRPM | TEMPERATURE: 97.1 F | DIASTOLIC BLOOD PRESSURE: 71 MMHG | WEIGHT: 221.6 LBS

## 2024-05-20 DIAGNOSIS — B34.9 VIRAL SYNDROME: Primary | ICD-10-CM

## 2024-05-20 DIAGNOSIS — R11.0 NAUSEA: ICD-10-CM

## 2024-05-20 PROCEDURE — 99213 OFFICE O/P EST LOW 20 MIN: CPT | Performed by: INTERNAL MEDICINE

## 2024-05-20 RX ORDER — ONDANSETRON 4 MG/1
4 TABLET, FILM COATED ORAL DAILY PRN
Qty: 10 TABLET | Refills: 0 | Status: SHIPPED | OUTPATIENT
Start: 2024-05-20

## 2024-05-20 SDOH — ECONOMIC STABILITY: FOOD INSECURITY: WITHIN THE PAST 12 MONTHS, THE FOOD YOU BOUGHT JUST DIDN'T LAST AND YOU DIDN'T HAVE MONEY TO GET MORE.: NEVER TRUE

## 2024-05-20 SDOH — ECONOMIC STABILITY: FOOD INSECURITY: WITHIN THE PAST 12 MONTHS, YOU WORRIED THAT YOUR FOOD WOULD RUN OUT BEFORE YOU GOT MONEY TO BUY MORE.: NEVER TRUE

## 2024-05-20 SDOH — ECONOMIC STABILITY: INCOME INSECURITY: HOW HARD IS IT FOR YOU TO PAY FOR THE VERY BASICS LIKE FOOD, HOUSING, MEDICAL CARE, AND HEATING?: NOT HARD AT ALL

## 2024-05-20 ASSESSMENT — PATIENT HEALTH QUESTIONNAIRE - PHQ9
2. FEELING DOWN, DEPRESSED OR HOPELESS: NOT AT ALL
SUM OF ALL RESPONSES TO PHQ QUESTIONS 1-9: 0
SUM OF ALL RESPONSES TO PHQ QUESTIONS 1-9: 0
1. LITTLE INTEREST OR PLEASURE IN DOING THINGS: NOT AT ALL
SUM OF ALL RESPONSES TO PHQ QUESTIONS 1-9: 0
SUM OF ALL RESPONSES TO PHQ QUESTIONS 1-9: 0
SUM OF ALL RESPONSES TO PHQ9 QUESTIONS 1 & 2: 0

## 2024-05-20 NOTE — PROGRESS NOTES
\"Have you been to the ER, urgent care clinic since your last visit?  Hospitalized since your last visit?\"    YES - When: approximately 1 months ago.  [facility]    “Have you seen or consulted any other health care providers outside of [facility] since your last visit?”    NO            Click Here for Release of Records Request

## 2024-05-20 NOTE — PROGRESS NOTES
Subjective:      Patient ID: Aaron Ortiz is a 24 y.o. female.    Acute vsit    Patient started feeling \"sick\" last Friday. She felt tiredness, fatigue, body aches, nausea, chills, and malaise. Denied abdominal pain, urinary symptoms or diarrhea. Denied known sick contacts. LMP started on 5/3 and ended on 5/10, regular characteristics. Symptoms has gotten better but still slightly nauseous. No other complaints.    She has a viral syndrome.  Since is started on Friday and she is already feeling better, no red flags, I would defer further testing.  I anticipate full reovery in next 48-72 hours.  We discussed red flags, if any or worsening, she will to [facility] or ED.  She will have an appt with me next week if symptoms persist as well or as needed.  Patient agreed          Anemia  Hx of heavy menstrual periods.  LMP 06/16/2023. Bleeding for 5-6 days, using 3 pads per day.   Denied hx of Thalassemia or Sickle cell disease in the family.  Low iron and ferritin.  Referral OBGYN today. Provided list of providers.        PAST MEDICAL HISTORY  Medical: History of iron deficiency in the past.  Surgical: Denied.  Allergies: Denied.  Medication: Denied.  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  Social: Tobacco denied, alcohol denied, recreational drugs marijuana only once.  Sexual: Single, no partner, no children, STI in the past trichomonas treated, no birth control.    Abdominal Pain  Pertinent negatives include no fever.   Back Pain  Pertinent negatives include no fever.   Knee Pain     Anemia  There has been no fever or palpitations.       Review of Systems   Constitutional:  Negative for fever.   Cardiovascular:  Negative for palpitations.       Objective:   Visit Vitals  /71 (Site: Left Upper Arm, Position: Sitting, Cuff Size: Large Adult)   Pulse 88   Temp 97.1 °F (36.2 °C) (Temporal)   Resp 12   Ht 1.7 m (5' 6.93\")   Wt 100.5 kg (221 lb 9.6 oz)   SpO2 95%   BMI 34.78 kg/m²        Physical